# Patient Record
Sex: FEMALE | Race: BLACK OR AFRICAN AMERICAN | Employment: FULL TIME | URBAN - METROPOLITAN AREA
[De-identification: names, ages, dates, MRNs, and addresses within clinical notes are randomized per-mention and may not be internally consistent; named-entity substitution may affect disease eponyms.]

---

## 2017-05-03 PROBLEM — N93.9 VAGINAL BLEEDING: Status: ACTIVE | Noted: 2017-05-03

## 2022-06-14 ENCOUNTER — HOSPITAL ENCOUNTER (INPATIENT)
Facility: HOSPITAL | Age: 47
LOS: 2 days | Discharge: HOME | DRG: 176 | End: 2022-06-16
Attending: EMERGENCY MEDICINE | Admitting: HOSPITALIST
Payer: COMMERCIAL

## 2022-06-14 DIAGNOSIS — I26.99 PULMONARY EMBOLISM: ICD-10-CM

## 2022-06-14 DIAGNOSIS — R07.9 CHEST PAIN: ICD-10-CM

## 2022-06-14 DIAGNOSIS — I26.99 PULMONARY EMBOLISM, UNSPECIFIED CHRONICITY, UNSPECIFIED PULMONARY EMBOLISM TYPE, UNSPECIFIED WHETHER ACUTE COR PULMONALE PRESENT: Primary | ICD-10-CM

## 2022-06-14 DIAGNOSIS — D64.9 ANEMIA, UNSPECIFIED TYPE: ICD-10-CM

## 2022-06-14 LAB
ALBUMIN SERPL-MCNC: 3.7 G/DL (ref 3.3–5.5)
ALP SERPL-CCNC: 65 U/L (ref 42–141)
APTT BLDCRRT: 23.6 SEC (ref 21–32)
APTT BLDCRRT: 34.2 SEC (ref 21–32)
B-HCG UR QL: NEGATIVE
BILIRUB SERPL-MCNC: 0.6 MG/DL (ref 0.2–1.6)
BUN SERPL-MCNC: 10 MG/DL (ref 7–22)
CALCIUM SERPL-MCNC: 9.1 MG/DL (ref 8–10.3)
CHLORIDE SERPL-SCNC: 102 MMOL/L (ref 98–108)
CREAT SERPL-MCNC: 0.7 MG/DL (ref 0.6–1.2)
CTP QC/QA: YES
CTP QC/QA: YES
GLUCOSE SERPL-MCNC: 98 MG/DL (ref 73–118)
HCT, POC: NORMAL
HCT, POC: NORMAL
HGB, POC: NORMAL (ref 14–18)
HGB, POC: NORMAL (ref 14–18)
INR PPP: 1 (ref 0.8–1.2)
MCH, POC: NORMAL
MCH, POC: NORMAL
MCHC, POC: NORMAL
MCHC, POC: NORMAL
MCV, POC: NORMAL
MCV, POC: NORMAL
MPV, POC: NORMAL
MPV, POC: NORMAL
POC ALT (SGPT): 18 U/L (ref 10–47)
POC AST (SGOT): 21 U/L (ref 11–38)
POC B-TYPE NATRIURETIC PEPTIDE: 34.4 PG/ML (ref 0–100)
POC CARDIAC TROPONIN I: 0 NG/ML
POC PLATELET COUNT: NORMAL
POC PLATELET COUNT: NORMAL
POC TCO2: 27 MMOL/L (ref 18–33)
POTASSIUM BLD-SCNC: 3.5 MMOL/L (ref 3.6–5.1)
PROTEIN, POC: 8.1 G/DL (ref 6.4–8.1)
PROTHROMBIN TIME: 10.9 SEC (ref 9–12.5)
RBC, POC: NORMAL
RBC, POC: NORMAL
RDW, POC: NORMAL
RDW, POC: NORMAL
SAMPLE: NORMAL
SARS-COV-2 RDRP RESP QL NAA+PROBE: NEGATIVE
SODIUM BLD-SCNC: 141 MMOL/L (ref 128–145)
WBC, POC: NORMAL
WBC, POC: NORMAL

## 2022-06-14 PROCEDURE — 85610 PROTHROMBIN TIME: CPT | Performed by: EMERGENCY MEDICINE

## 2022-06-14 PROCEDURE — 93010 ELECTROCARDIOGRAM REPORT: CPT | Mod: ,,, | Performed by: INTERNAL MEDICINE

## 2022-06-14 PROCEDURE — 25500020 PHARM REV CODE 255: Mod: ER | Performed by: EMERGENCY MEDICINE

## 2022-06-14 PROCEDURE — 93010 EKG 12-LEAD: ICD-10-PCS | Mod: ,,, | Performed by: INTERNAL MEDICINE

## 2022-06-14 PROCEDURE — 25000003 PHARM REV CODE 250: Mod: ER | Performed by: EMERGENCY MEDICINE

## 2022-06-14 PROCEDURE — 84484 ASSAY OF TROPONIN QUANT: CPT | Mod: ER

## 2022-06-14 PROCEDURE — 81025 URINE PREGNANCY TEST: CPT | Mod: ER | Performed by: EMERGENCY MEDICINE

## 2022-06-14 PROCEDURE — 96360 HYDRATION IV INFUSION INIT: CPT | Mod: ER

## 2022-06-14 PROCEDURE — U0002 COVID-19 LAB TEST NON-CDC: HCPCS | Mod: ER | Performed by: EMERGENCY MEDICINE

## 2022-06-14 PROCEDURE — 80053 COMPREHEN METABOLIC PANEL: CPT | Mod: ER

## 2022-06-14 PROCEDURE — 85730 THROMBOPLASTIN TIME PARTIAL: CPT | Performed by: EMERGENCY MEDICINE

## 2022-06-14 PROCEDURE — 83880 ASSAY OF NATRIURETIC PEPTIDE: CPT | Mod: ER

## 2022-06-14 PROCEDURE — 63600175 PHARM REV CODE 636 W HCPCS: Mod: ER | Performed by: EMERGENCY MEDICINE

## 2022-06-14 PROCEDURE — 25000003 PHARM REV CODE 250: Performed by: HOSPITALIST

## 2022-06-14 PROCEDURE — 36415 COLL VENOUS BLD VENIPUNCTURE: CPT | Performed by: HOSPITALIST

## 2022-06-14 PROCEDURE — 93005 ELECTROCARDIOGRAM TRACING: CPT | Mod: ER

## 2022-06-14 PROCEDURE — 85730 THROMBOPLASTIN TIME PARTIAL: CPT | Mod: 91 | Performed by: HOSPITALIST

## 2022-06-14 PROCEDURE — 21400001 HC TELEMETRY ROOM

## 2022-06-14 PROCEDURE — 99291 CRITICAL CARE FIRST HOUR: CPT | Mod: 25,ER

## 2022-06-14 PROCEDURE — 85025 COMPLETE CBC W/AUTO DIFF WBC: CPT | Mod: ER

## 2022-06-14 RX ORDER — ONDANSETRON 2 MG/ML
8 INJECTION INTRAMUSCULAR; INTRAVENOUS EVERY 6 HOURS PRN
Status: DISCONTINUED | OUTPATIENT
Start: 2022-06-14 | End: 2022-06-16 | Stop reason: HOSPADM

## 2022-06-14 RX ORDER — HEPARIN SODIUM,PORCINE/D5W 25000/250
18 INTRAVENOUS SOLUTION INTRAVENOUS CONTINUOUS
Status: DISCONTINUED | OUTPATIENT
Start: 2022-06-14 | End: 2022-06-16

## 2022-06-14 RX ORDER — TALC
6 POWDER (GRAM) TOPICAL NIGHTLY PRN
Status: DISCONTINUED | OUTPATIENT
Start: 2022-06-14 | End: 2022-06-16 | Stop reason: HOSPADM

## 2022-06-14 RX ORDER — ACETAMINOPHEN 325 MG/1
650 TABLET ORAL EVERY 4 HOURS PRN
Status: DISCONTINUED | OUTPATIENT
Start: 2022-06-14 | End: 2022-06-16 | Stop reason: HOSPADM

## 2022-06-14 RX ORDER — OXYCODONE AND ACETAMINOPHEN 5; 325 MG/1; MG/1
1 TABLET ORAL EVERY 4 HOURS PRN
Status: DISCONTINUED | OUTPATIENT
Start: 2022-06-14 | End: 2022-06-16 | Stop reason: HOSPADM

## 2022-06-14 RX ORDER — FUROSEMIDE 20 MG/1
TABLET ORAL
Status: ON HOLD | COMMUNITY
Start: 2022-06-10 | End: 2022-06-16 | Stop reason: HOSPADM

## 2022-06-14 RX ORDER — GUAIFENESIN/DEXTROMETHORPHAN 100-10MG/5
5 SYRUP ORAL EVERY 6 HOURS PRN
Status: DISCONTINUED | OUTPATIENT
Start: 2022-06-14 | End: 2022-06-16 | Stop reason: HOSPADM

## 2022-06-14 RX ORDER — POLYETHYLENE GLYCOL 3350 17 G/17G
17 POWDER, FOR SOLUTION ORAL 2 TIMES DAILY PRN
Status: DISCONTINUED | OUTPATIENT
Start: 2022-06-14 | End: 2022-06-16 | Stop reason: HOSPADM

## 2022-06-14 RX ORDER — SODIUM CHLORIDE 0.9 % (FLUSH) 0.9 %
10 SYRINGE (ML) INJECTION EVERY 12 HOURS PRN
Status: DISCONTINUED | OUTPATIENT
Start: 2022-06-14 | End: 2022-06-16 | Stop reason: HOSPADM

## 2022-06-14 RX ORDER — APIXABAN 5 MG/1
5 TABLET, FILM COATED ORAL 2 TIMES DAILY
Status: ON HOLD | COMMUNITY
Start: 2022-06-10 | End: 2022-06-16 | Stop reason: SDUPTHER

## 2022-06-14 RX ORDER — BENZONATATE 100 MG/1
200 CAPSULE ORAL
Status: COMPLETED | OUTPATIENT
Start: 2022-06-14 | End: 2022-06-14

## 2022-06-14 RX ADMIN — GUAIFENESIN AND DEXTROMETHORPHAN 5 ML: 100; 10 SYRUP ORAL at 03:06

## 2022-06-14 RX ADMIN — BENZONATATE 200 MG: 100 CAPSULE ORAL at 10:06

## 2022-06-14 RX ADMIN — OXYCODONE AND ACETAMINOPHEN 1 TABLET: 5; 325 TABLET ORAL at 03:06

## 2022-06-14 RX ADMIN — IOHEXOL 90 ML: 350 INJECTION, SOLUTION INTRAVENOUS at 09:06

## 2022-06-14 RX ADMIN — SODIUM CHLORIDE 1000 ML: 0.9 INJECTION, SOLUTION INTRAVENOUS at 08:06

## 2022-06-14 RX ADMIN — HEPARIN SODIUM 18 UNITS/KG/HR: 5000 INJECTION INTRAVENOUS; SUBCUTANEOUS at 11:06

## 2022-06-14 NOTE — SUBJECTIVE & OBJECTIVE
Past Medical History:   Diagnosis Date    DVT (deep venous thrombosis)        Past Surgical History:   Procedure Laterality Date    TUBAL LIGATION         Review of patient's allergies indicates:  No Known Allergies    No current facility-administered medications on file prior to encounter.     Current Outpatient Medications on File Prior to Encounter   Medication Sig    ELIQUIS 5 mg Tab Take 5 mg by mouth 2 (two) times daily.    furosemide (LASIX) 20 MG tablet TAKE 1 TABLET BY MOUTH EVERY DAY FOR 3 DAYS     Family History    None       Tobacco Use    Smoking status: Never Smoker    Smokeless tobacco: Never Used   Substance and Sexual Activity    Alcohol use: Never    Drug use: No    Sexual activity: Yes     Partners: Male     Birth control/protection: Surgical     Review of Systems   Constitutional:  Negative for chills and fever.   HENT:  Negative for ear discharge and ear pain.    Eyes:  Negative for discharge and itching.   Respiratory:  Positive for cough and shortness of breath.    Cardiovascular:  Positive for chest pain and leg swelling.   Gastrointestinal:  Negative for abdominal distention and abdominal pain.   Endocrine: Negative for cold intolerance.   Genitourinary:  Negative for difficulty urinating and dysuria.   Musculoskeletal:  Negative for neck pain and neck stiffness.   Skin:  Negative for rash and wound.   Neurological:  Negative for seizures and syncope.   Psychiatric/Behavioral:  Negative for agitation and hallucinations.    Objective:     Vital Signs (Most Recent):  Temp: 99 °F (37.2 °C) (06/14/22 0641)  Pulse: 83 (06/14/22 1201)  Resp: 18 (06/14/22 1533)  BP: (!) 161/89 (06/14/22 1201)  SpO2: 100 % (06/14/22 1201)   Vital Signs (24h Range):  Temp:  [99 °F (37.2 °C)] 99 °F (37.2 °C)  Pulse:  [] 83  Resp:  [18-21] 18  SpO2:  [100 %] 100 %  BP: (124-161)/(81-89) 161/89     Weight: 93.3 kg (205 lb 11 oz)  Body mass index is 31.27 kg/m².    Physical Exam  Constitutional:       Appearance:  She is not toxic-appearing or diaphoretic.   HENT:      Head: Normocephalic and atraumatic.      Mouth/Throat:      Pharynx: Oropharynx is clear. No oropharyngeal exudate or posterior oropharyngeal erythema.   Cardiovascular:      Rate and Rhythm: Normal rate and regular rhythm.   Pulmonary:      Breath sounds: Normal breath sounds. No wheezing.   Abdominal:      General: Bowel sounds are normal.      Palpations: Abdomen is soft.   Musculoskeletal:         General: No deformity.      Right lower leg: Edema present.   Skin:     General: Skin is warm and dry.   Neurological:      Mental Status: She is oriented to person, place, and time. Mental status is at baseline.           Significant Labs: All pertinent labs within the past 24 hours have been reviewed.  BMP: No results for input(s): GLU, NA, K, CL, CO2, BUN, CREATININE, CALCIUM, MG in the last 48 hours.  CBC: No results for input(s): WBC, HGB, HCT, PLT in the last 48 hours.    Significant Imaging: I have reviewed all pertinent imaging results/findings within the past 24 hours.

## 2022-06-14 NOTE — NURSING
Report received from Lauryn at [facility] ED. Patient to come to 307 with heparin infusing

## 2022-06-14 NOTE — H&P
[facility] Medicine  History & Physical    Patient Name: Sandra Rodriguez  MRN: [id number]  Patient Class: IP- Inpatient  Admission Date: 6/14/2022  Attending Physician: Paul Gregory MD   Primary Care Provider: Cadence Garcia MD         Patient information was obtained from patient and ER records.     Subjective:     Principal Problem:Acute pulmonary embolism    Chief Complaint:   Chief Complaint   Patient presents with    Chest Pain     Reports left side chest pain x 2 days with SOB. Has been taking Ibuprofen with no relief. Denies N/V. Hx of DVT currently taking Eliquis.         HPI: 45 y/o female with recently diagnosed DVT presented to the ER with constant sharp left sided chest pain, slight dyspnea and cough.  Symptoms began yesterday at work.  Patient reports her cough began before the chest pain.  Patient also complains of right leg swelling and numbness.  She she was diagnosed with a right leg DVT 4 days ago and started on Eliquis.  States compliance with medication.  Denies any fever or chills.  He presented to [facility] ER where she was diagnosed with PE.  Denies any recent immobilization.  Started on Heparin drip.  No other complaints.      Past Medical History:   Diagnosis Date    DVT (deep venous thrombosis)        Past Surgical History:   Procedure Laterality Date    TUBAL LIGATION         Review of patient's allergies indicates:  No Known Allergies    No current facility-administered medications on file prior to encounter.     Current Outpatient Medications on File Prior to Encounter   Medication Sig    ELIQUIS 5 mg Tab Take 5 mg by mouth 2 (two) times daily.    furosemide (LASIX) 20 MG tablet TAKE 1 TABLET BY MOUTH EVERY DAY FOR 3 DAYS     Family History    None       Tobacco Use    Smoking status: Never Smoker    Smokeless tobacco: Never Used   Substance and Sexual Activity    Alcohol use: Never    Drug use: No    Sexual activity: Yes     Partners: Male     Birth  control/protection: Surgical     Review of Systems   Constitutional:  Negative for chills and fever.   HENT:  Negative for ear discharge and ear pain.    Eyes:  Negative for discharge and itching.   Respiratory:  Positive for cough and shortness of breath.    Cardiovascular:  Positive for chest pain and leg swelling.   Gastrointestinal:  Negative for abdominal distention and abdominal pain.   Endocrine: Negative for cold intolerance.   Genitourinary:  Negative for difficulty urinating and dysuria.   Musculoskeletal:  Negative for neck pain and neck stiffness.   Skin:  Negative for rash and wound.   Neurological:  Negative for seizures and syncope.   Psychiatric/Behavioral:  Negative for agitation and hallucinations.    Objective:     Vital Signs (Most Recent):  Temp: 99 °F (37.2 °C) (06/14/22 0641)  Pulse: 83 (06/14/22 1201)  Resp: 18 (06/14/22 1533)  BP: (!) 161/89 (06/14/22 1201)  SpO2: 100 % (06/14/22 1201)   Vital Signs (24h Range):  Temp:  [99 °F (37.2 °C)] 99 °F (37.2 °C)  Pulse:  [] 83  Resp:  [18-21] 18  SpO2:  [100 %] 100 %  BP: (124-161)/(81-89) 161/89     Weight: 93.3 kg (205 lb 11 oz)  Body mass index is 31.27 kg/m².    Physical Exam  Constitutional:       Appearance: She is not toxic-appearing or diaphoretic.   HENT:      Head: Normocephalic and atraumatic.      Mouth/Throat:      Pharynx: Oropharynx is clear. No oropharyngeal exudate or posterior oropharyngeal erythema.   Cardiovascular:      Rate and Rhythm: Normal rate and regular rhythm.   Pulmonary:      Breath sounds: Normal breath sounds. No wheezing.   Abdominal:      General: Bowel sounds are normal.      Palpations: Abdomen is soft.   Musculoskeletal:         General: No deformity.      Right lower leg: Edema present.   Skin:     General: Skin is warm and dry.   Neurological:      Mental Status: She is oriented to person, place, and time. Mental status is at baseline.           Significant Labs: All pertinent labs within the past 24  hours have been reviewed.  BMP: No results for input(s): GLU, NA, K, CL, CO2, BUN, CREATININE, CALCIUM, MG in the last 48 hours.  CBC: No results for input(s): WBC, HGB, HCT, PLT in the last 48 hours.    Significant Imaging: I have reviewed all pertinent imaging results/findings within the past 24 hours.    Assessment/Plan:     * Acute pulmonary embolism  Recent diagnosis of RLE DVT 4 days ago.  Started on Eliquis (10 mg bid).  Reports compliance with medication.  CT showing acute bilateral pulmonary emboli, with the central most filling defects in the left main pulmonary artery.  Additional bilateral lobar, interlobar, segmental, and subsegmental emboli are noted, eccentric to the left. Equivocal flattening of the interventricular septum, raising the possibility of right heart strain.   Apparent unprovoked DVT/PE.  Started on heparin drip.  Will consult Hematology.  Check Echo.  Discussed with IR.  Hemodynamically stable.  No intervention per IR and continue anticoagulation.      Anemia  Microcytic anemia.  Reports heavy menses.  No evidence of active bleeding.  Check iron studies.        VTE Risk Mitigation (From admission, onward)         Ordered     IP VTE HIGH RISK PATIENT  Once         06/14/22 1357     Place sequential compression device  Until discontinued         06/14/22 1357     heparin 25,000 units in dextrose 5% (100 units/ml) IV bolus from bag - ADDITIONAL PRN BOLUS - 60 units/kg  As needed (PRN)        Question:  Heparin Infusion Adjustment (DO NOT MODIFY ANSWER)  Answer:  \\[email or web address]\Images\Pharmacy\HeparinInfusions\heparin HIGH INTENSITY nomogram for [facility] [id number]    06/14/22 1112     heparin 25,000 units in dextrose 5% (100 units/ml) IV bolus from bag - ADDITIONAL PRN BOLUS - 30 units/kg  As needed (PRN)        Question:  Heparin Infusion Adjustment (DO NOT MODIFY ANSWER)  Answer:  \\[email or web address]\epic\Images\Pharmacy\HeparinInfusions\heparin HIGH INTENSITY nomogram for [facility] [id number].pdf     06/14/22 1112     heparin 25,000 units in dextrose 5% 250 mL (100 units/mL) infusion HIGH INTENSITY nomogram - [facility]  Continuous        Question Answer Comment   Heparin Infusion Adjustment (DO NOT MODIFY ANSWER) \\[email or web address]\epic\Images\Pharmacy\HeparinInfusions\heparin HIGH INTENSITY nomogram for [facility] [id number].pdf    Begin at (in units/kg/hr) 18        06/14/22 1112                   Paul Gregory MD  Department of Hospital Medicine   [facility] [place] [facility]

## 2022-06-14 NOTE — ED PROVIDER NOTES
Encounter Date: 6/14/2022    SCRIBE #1 NOTE: I, Richie Frausto, am scribing for, and in the presence of,  Marlen Steven. I have scribed the following portions of the note - Other sections scribed: HPI, ROS, PE.       History     Chief Complaint   Patient presents with    Chest Pain     Reports left side chest pain x 2 days with SOB. Has been taking Ibuprofen with no relief. Denies N/V. Hx of DVT currently taking Eliquis.      Sandra Rodriguez 46 y.o. female, with DVT, presents to the ED with constant sharp left sided chest pain, slight SOB, and cough that began yesterday at work. Patient reports her cough began before the chest pain. Patient also complains of right leg swelling and numbness from a knee injury last week. She states she was diagnosed with a DVT 2 days ago and has been on eliquis since. Patient denies fever, runny nose, congestion, nausea, vomiting, diarrhea, or other associated symptoms. No known alleviating or aggravating factors. Denies smoking, alcohol consumption, or illicit drug use.      The history is provided by the patient. No  was used.     Review of patient's allergies indicates:  No Known Allergies  Past Medical History:   Diagnosis Date    DVT (deep venous thrombosis)      Past Surgical History:   Procedure Laterality Date    TUBAL LIGATION       Family History   Problem Relation Age of Onset    Breast cancer Neg Hx     Colon cancer Neg Hx     Ovarian cancer Neg Hx      Social History     Tobacco Use    Smoking status: Never Smoker    Smokeless tobacco: Never Used   Substance Use Topics    Alcohol use: Never    Drug use: No     Review of Systems   Constitutional: Negative for activity change, appetite change, chills and fever.   HENT: Negative for congestion, postnasal drip, rhinorrhea, sneezing and sore throat.    Respiratory: Positive for cough and shortness of breath.    Cardiovascular: Positive for chest pain and leg swelling.   Gastrointestinal: Negative for  abdominal pain, diarrhea, nausea and vomiting.   Neurological: Positive for numbness. Negative for dizziness, syncope, light-headedness and headaches.   All other systems reviewed and are negative.      Physical Exam     Initial Vitals [06/14/22 0641]   BP Pulse Resp Temp SpO2   124/81 105 20 99 °F (37.2 °C) 100 %      MAP       --         Physical Exam    Nursing note and vitals reviewed.  Constitutional: She appears well-developed and well-nourished. No distress.   HENT:   Head: Normocephalic and atraumatic.   Eyes: Conjunctivae are normal.   Neck:   Normal range of motion.  Cardiovascular: Normal rate, regular rhythm and normal heart sounds.   No murmur heard.  Pulmonary/Chest: Breath sounds normal. No respiratory distress. She exhibits no tenderness.   Abdominal: Bowel sounds are normal. She exhibits no distension.   Musculoskeletal:         General: Normal range of motion.      Cervical back: Normal range of motion.      Right lower leg: Edema present.     Neurological: She is alert and oriented to person, place, and time.   Skin: Skin is warm and dry.   Psychiatric: She has a normal mood and affect. Her behavior is normal.         ED Course   Critical Care    Date/Time: 6/14/2022 12:33 PM  Performed by: Tenisha Gee MD  Authorized by: Tenisha Gee MD   Direct patient critical care time: 30 minutes  Additional history critical care time: 5 minutes  Ordering / reviewing critical care time: 10 minutes  Documentation critical care time: 8 minutes  Consulting other physicians critical care time: 8 minutes  Total critical care time (exclusive of procedural time) : 61 minutes  Critical care time was exclusive of separately billable procedures and treating other patients.  Critical care was necessary to treat or prevent imminent or life-threatening deterioration of the following conditions: cardiac failure, circulatory failure and respiratory failure.  Critical care was time spent personally by me on the following  activities: development of treatment plan with patient or surrogate, discussions with consultants, interpretation of cardiac output measurements, evaluation of patient's response to treatment, examination of patient, obtaining history from patient or surrogate, ordering and performing treatments and interventions, ordering and review of laboratory studies, ordering and review of radiographic studies, pulse oximetry, re-evaluation of patient's condition and review of old charts.        Labs Reviewed   POCT CMP - Abnormal; Notable for the following components:       Result Value    POC Potassium 3.5 (*)     All other components within normal limits   TROPONIN ISTAT   APTT   PROTIME-INR   POCT CBC   POCT URINE PREGNANCY   POCT CBC   SARS-COV-2 RDRP GENE    Narrative:     This test utilizes isothermal nucleic acid amplification   technology to detect the SARS-CoV-2 RdRp nucleic acid segment.   The analytical sensitivity (limit of detection) is 125 genome   equivalents/mL.   A POSITIVE result implies infection with the SARS-CoV-2 virus;   the patient is presumed to be contagious.     A NEGATIVE result means that SARS-CoV-2 nucleic acids are not   present above the limit of detection. A NEGATIVE result should be   treated as presumptive. It does not rule out the possibility of   COVID-19 and should not be the sole basis for treatment decisions.   If COVID-19 is strongly suspected based on clinical and exposure   history, re-testing using an alternate molecular assay should be   considered.   This test is only for use under the Food and Drug   Administration s Emergency Use Authorization (EUA).   Commercial kits are provided by [employer].   Performance characteristics of the EUA have been independently   verified by [facility] Department of   Pathology and Laboratory Medicine.   _________________________________________________________________   The authorized Fact Sheet for Healthcare Providers and  the authorized Fact   Sheet for Patients of the ID NOW COVID-19 are available on the FDA   website:     https://www.fda.gov/media/567149/download  https://www.fda.gov/media/070883/download          POCT CMP   POCT TROPONIN   POCT B-TYPE NATRIURETIC PEPTIDE (BNP)   POCT B-TYPE NATRIURETIC PEPTIDE (BNP)          Imaging Results           CTA Chest Non-Coronary (PE Study) (Final result)  Result time 06/14/22 10:06:02    Final result by Josh Bland MD (06/14/22 10:06:02)                 Impression:      1. Acute bilateral pulmonary emboli are noted, with the central most filling defects in the left main pulmonary artery approximately 28 mm from the bifurcation.  Additional bilateral lobar, interlobar, segmental, and subsegmental emboli are noted, eccentric to the left. Equivocal flattening of the interventricular septum, raising the possibility of right heart strain.  2. Limited assessment of the lung parenchyma without definite acute abnormality.  3. Ill-defined focus of hyperattenuation/enhancement measuring 12 mm in the right hepatic lobe.  This could represent transient vascular phenomenon versus true enhancing lesion.  Further characterization with dedicated liver mass protocol MRI or CT would be recommended for more definitive assessment.  4. Additional details as per the body of report.  This report was flagged in Epic as abnormal.    Findings in Impression #1-2 discussed by phone with Dr. Gee approximately 09:57, 06/14/2022.      Electronically signed by: Josh Bland  Date:    06/14/2022  Time:    10:06             Narrative:    EXAMINATION:  CTA CHEST NON CORONARY    CLINICAL HISTORY:  Pulmonary embolism (PE) suspected, high prob;    TECHNIQUE:  Low dose axial images, sagittal and coronal reformations were obtained from the thoracic inlet to the lung bases following the IV administration of 90 mL of Omnipaque 350.  Contrast timing was optimized to evaluate the pulmonary arteries.  MIP images were  performed.    COMPARISON:  None    FINDINGS:  Exam quality: Satisfactory.    Pulmonary embolism: Acute bilateral pulmonary emboli are noted, with the central most filling defects in the left main pulmonary artery approximately 28 mm from the bifurcation.  Additional bilateral lobar, interlobar, segmental, and subsegmental emboli are noted, eccentric to the left.    Central pulmonary arteries: Normal caliber.    Aorta: Normal caliber.    Heart: Equivocal flattening of the interventricular septum.    Coronary arteries: No calcifications.    Pericardium: Normal. No effusion, thickening, or calcification.    Support tubes and lines: None.    Base of neck/thyroid: Normal.    Lymph nodes: No supraclavicular, axillary, internal mammary, mediastinal, or hilar adenopathy.    Esophagus: Normal.    Pleura: No effusion, thickening, or calcification.    Upper abdomen: Simple cyst is noted in the right hepatic lobe.  Ill-defined focus of hyperattenuation/enhancement measuring 12 mm in the right hepatic lobe (series 2, image 171).    Body wall: Unremarkable    Airways: Central airways appear patent.    Lungs: Assessment of the lungs is substantially compromised by respiratory motion.  Dependent atelectasis is suggested.    Bones: No definite acute findings.                                 Medications   heparin 25,000 units in dextrose 5% 250 mL (100 units/mL) infusion HIGH INTENSITY nomogram - [facility] (18 Units/kg/hr × 76.5 kg (Adjusted) Intravenous New Bag 6/14/22 1153)   heparin 25,000 units in dextrose 5% (100 units/ml) IV bolus from bag - ADDITIONAL PRN BOLUS - 60 units/kg (has no administration in time range)   heparin 25,000 units in dextrose 5% (100 units/ml) IV bolus from bag - ADDITIONAL PRN BOLUS - 30 units/kg (has no administration in time range)   sodium chloride 0.9% bolus 1,000 mL (0 mLs Intravenous Stopped 6/14/22 0911)   iohexoL (OMNIPAQUE 350) injection 100 mL (90 mLs Intravenous Given 6/14/22 0949)   benzonatate  capsule 200 mg (200 mg Oral Given 6/14/22 1050)   heparin 25,000 units in dextrose 5% (100 units/ml) IV bolus from bag INITIAL BOLUS (6,120 Units Intravenous Bolus from Bag 6/14/22 1153)     Medical Decision Making:   History:   Old Medical Records: I decided to obtain old medical records.  Independently Interpreted Test(s):   I have ordered and independently interpreted X-rays - see prior notes.  I have ordered and independently interpreted EKG Reading(s) - see prior notes  Clinical Tests:   Lab Tests: Ordered and Reviewed  Radiological Study: Ordered and Reviewed  ED Management:  CT was dicussed with the radiologist - pt has large clot burden with right heart strain.  Vital signs are stable.  Admission was discussed with Dr. Gregory- will start heparin.  Other:   I have discussed this case with another health care provider.              Scribe Attestation:   Scribe #1: I performed the above scribed service and the documentation accurately describes the services I performed. I attest to the accuracy of the note.               I, Dr. Tenisha Gee, personally performed the services described in this documentation.   All medical record entries made by the scribe were at my direction and in my presence.   I have reviewed the chart and agree that the record is accurate and complete.   Tenisha Gee MD.  8:20 AM 06/14/2022     Clinical Impression:   Final diagnoses:  [R07.9] Chest pain  [I26.99] Pulmonary embolism, unspecified chronicity, unspecified pulmonary embolism type, unspecified whether acute cor pulmonale present (Primary)  [D64.9] Anemia, unspecified type          ED Disposition Condition    Admit               Tenisha Gee MD  06/14/22 1234

## 2022-06-14 NOTE — NURSING TRANSFER
Nursing Transfer Note      6/14/2022       Reason patient is being transferred: Pulmonary Embolism      Transfer From: [facility]      Transfer via stretcher      Transfer with cardiac monitoring      Transported by EMS Services      Medicines sent: No      Any special needs or follow-up needed: None      Chart send with patient: Yes      Notified: daughter      Patient reassessed at: 6/14/2022, 1425      Upon arrival to floor: cardiac monitor applied, patient oriented to room, call bell in reach and bed in lowest position    Heparin Handoff Completed at bedside.

## 2022-06-14 NOTE — ASSESSMENT & PLAN NOTE
Recent diagnosis of RLE DVT 4 days ago.  Started on Eliquis (10 mg bid).  Reports compliance with medication.  CT showing acute bilateral pulmonary emboli, with the central most filling defects in the left main pulmonary artery.  Additional bilateral lobar, interlobar, segmental, and subsegmental emboli are noted, eccentric to the left. Equivocal flattening of the interventricular septum, raising the possibility of right heart strain.   Apparent unprovoked DVT/PE.  Started on heparin drip.  Will consult Hematology.  Check Echo.  Discussed with IR.  Hemodynamically stable.  No intervention per IR and continue anticoagulation.

## 2022-06-14 NOTE — HPI
45 y/o female with recently diagnosed DVT presented to the ER with constant sharp left sided chest pain, slight dyspnea and cough.  Symptoms began yesterday at work.  Patient reports her cough began before the chest pain.  Patient also complains of right leg swelling and numbness.  She she was diagnosed with a right leg DVT 4 days ago and started on Eliquis.  States compliance with medication.  Denies any fever or chills.  He presented to [facility] ER where she was diagnosed with PE.  Denies any recent immobilization.  Started on Heparin drip.  No other complaints.

## 2022-06-15 PROBLEM — N92.2 EXCESSIVE MENSTRUATION AT PUBERTY: Status: ACTIVE | Noted: 2022-06-15

## 2022-06-15 PROBLEM — D50.9 IDA (IRON DEFICIENCY ANEMIA): Status: ACTIVE | Noted: 2022-06-15

## 2022-06-15 PROBLEM — N92.2 EXCESSIVE MENSTRUATION AT PUBERTY: Status: RESOLVED | Noted: 2022-06-15 | Resolved: 2022-06-15

## 2022-06-15 LAB
ANION GAP SERPL CALC-SCNC: 8 MMOL/L (ref 8–16)
APTT BLDCRRT: 42.8 SEC (ref 21–32)
APTT BLDCRRT: 46.8 SEC (ref 21–32)
ASCENDING AORTA: 2.8 CM
AV INDEX (PROSTH): 0.79
AV MEAN GRADIENT: 6 MMHG
AV PEAK GRADIENT: 9 MMHG
AV VALVE AREA: 2.6 CM2
AV VELOCITY RATIO: 0.84
BASOPHILS # BLD AUTO: 0.01 K/UL (ref 0–0.2)
BASOPHILS NFR BLD: 0.1 % (ref 0–1.9)
BSA FOR ECHO PROCEDURE: 2.12 M2
BUN SERPL-MCNC: 8 MG/DL (ref 6–20)
CALCIUM SERPL-MCNC: 8.5 MG/DL (ref 8.7–10.5)
CHLORIDE SERPL-SCNC: 106 MMOL/L (ref 95–110)
CO2 SERPL-SCNC: 24 MMOL/L (ref 23–29)
CREAT SERPL-MCNC: 0.7 MG/DL (ref 0.5–1.4)
CV ECHO LV RWT: 0.47 CM
DIFFERENTIAL METHOD: ABNORMAL
DOP CALC AO PEAK VEL: 1.54 M/S
DOP CALC AO VTI: 26.53 CM
DOP CALC LVOT AREA: 3.3 CM2
DOP CALC LVOT DIAMETER: 2.05 CM
DOP CALC LVOT PEAK VEL: 1.3 M/S
DOP CALC LVOT STROKE VOLUME: 68.95 CM3
DOP CALCLVOT PEAK VEL VTI: 20.9 CM
E WAVE DECELERATION TIME: 246.91 MSEC
E/A RATIO: 0.78
E/E' RATIO: 7.38 M/S
ECHO LV POSTERIOR WALL: 1.08 CM (ref 0.6–1.1)
EJECTION FRACTION: 55 %
EOSINOPHIL # BLD AUTO: 0.1 K/UL (ref 0–0.5)
EOSINOPHIL NFR BLD: 0.7 % (ref 0–8)
ERYTHROCYTE [DISTWIDTH] IN BLOOD BY AUTOMATED COUNT: 17.2 % (ref 11.5–14.5)
EST. GFR  (AFRICAN AMERICAN): >60 ML/MIN/1.73 M^2
EST. GFR  (NON AFRICAN AMERICAN): >60 ML/MIN/1.73 M^2
FERRITIN SERPL-MCNC: 41 NG/ML (ref 20–300)
FRACTIONAL SHORTENING: 27 % (ref 28–44)
GLUCOSE SERPL-MCNC: 103 MG/DL (ref 70–110)
HCT VFR BLD AUTO: 20.8 % (ref 37–48.5)
HGB BLD-MCNC: 6.1 G/DL (ref 12–16)
IMM GRANULOCYTES # BLD AUTO: 0.02 K/UL (ref 0–0.04)
IMM GRANULOCYTES NFR BLD AUTO: 0.3 % (ref 0–0.5)
INTERVENTRICULAR SEPTUM: 1.13 CM (ref 0.6–1.1)
IRON SERPL-MCNC: 13 UG/DL (ref 30–160)
IVRT: 97.05 MSEC
LA MAJOR: 5.95 CM
LA MINOR: 5.14 CM
LA WIDTH: 3.68 CM
LEFT ATRIUM SIZE: 2.8 CM
LEFT ATRIUM VOLUME INDEX: 23.3 ML/M2
LEFT ATRIUM VOLUME: 48.31 CM3
LEFT INTERNAL DIMENSION IN SYSTOLE: 3.4 CM (ref 2.1–4)
LEFT VENTRICLE DIASTOLIC VOLUME INDEX: 47.67 ML/M2
LEFT VENTRICLE DIASTOLIC VOLUME: 98.67 ML
LEFT VENTRICLE MASS INDEX: 89 G/M2
LEFT VENTRICLE SYSTOLIC VOLUME INDEX: 22.9 ML/M2
LEFT VENTRICLE SYSTOLIC VOLUME: 47.4 ML
LEFT VENTRICULAR INTERNAL DIMENSION IN DIASTOLE: 4.63 CM (ref 3.5–6)
LEFT VENTRICULAR MASS: 184.27 G
LV LATERAL E/E' RATIO: 5.9 M/S
LV SEPTAL E/E' RATIO: 9.83 M/S
LYMPHOCYTES # BLD AUTO: 1.3 K/UL (ref 1–4.8)
LYMPHOCYTES NFR BLD: 18.7 % (ref 18–48)
MAGNESIUM SERPL-MCNC: 2.1 MG/DL (ref 1.6–2.6)
MCH RBC QN AUTO: 23.5 PG (ref 27–31)
MCHC RBC AUTO-ENTMCNC: 29.3 G/DL (ref 32–36)
MCV RBC AUTO: 80 FL (ref 82–98)
MONOCYTES # BLD AUTO: 0.7 K/UL (ref 0.3–1)
MONOCYTES NFR BLD: 9.3 % (ref 4–15)
MV PEAK A VEL: 0.76 M/S
MV PEAK E VEL: 0.59 M/S
MV STENOSIS PRESSURE HALF TIME: 71.6 MS
MV VALVE AREA P 1/2 METHOD: 3.07 CM2
NEUTROPHILS # BLD AUTO: 4.9 K/UL (ref 1.8–7.7)
NEUTROPHILS NFR BLD: 70.9 % (ref 38–73)
NRBC BLD-RTO: 0 /100 WBC
PHOSPHATE SERPL-MCNC: 3 MG/DL (ref 2.7–4.5)
PISA TR MAX VEL: 2.06 M/S
PLATELET # BLD AUTO: 411 K/UL (ref 150–450)
PMV BLD AUTO: 9.5 FL (ref 9.2–12.9)
POTASSIUM SERPL-SCNC: 3.3 MMOL/L (ref 3.5–5.1)
PULM VEIN S/D RATIO: 1.94
PV PEAK D VEL: 0.48 M/S
PV PEAK S VEL: 0.93 M/S
PV PEAK VELOCITY: 0.99 CM/S
RA MAJOR: 5.43 CM
RA PRESSURE: 3 MMHG
RA WIDTH: 3.6 CM
RBC # BLD AUTO: 2.6 M/UL (ref 4–5.4)
RIGHT VENTRICULAR END-DIASTOLIC DIMENSION: 3.86 CM
RV TISSUE DOPPLER FREE WALL SYSTOLIC VELOCITY 1 (APICAL 4 CHAMBER VIEW): 14.76 CM/S
SATURATED IRON: 3 % (ref 20–50)
SODIUM SERPL-SCNC: 138 MMOL/L (ref 136–145)
STJ: 2.8 CM
TDI LATERAL: 0.1 M/S
TDI SEPTAL: 0.06 M/S
TDI: 0.08 M/S
TOTAL IRON BINDING CAPACITY: 413 UG/DL (ref 250–450)
TR MAX PG: 17 MMHG
TRANSFERRIN SERPL-MCNC: 279 MG/DL (ref 200–375)
TRICUSPID ANNULAR PLANE SYSTOLIC EXCURSION: 2.4 CM
TV REST PULMONARY ARTERY PRESSURE: 20 MMHG
WBC # BLD AUTO: 6.96 K/UL (ref 3.9–12.7)

## 2022-06-15 PROCEDURE — 25000003 PHARM REV CODE 250: Performed by: EMERGENCY MEDICINE

## 2022-06-15 PROCEDURE — 99232 PR SUBSEQUENT HOSPITAL CARE,LEVL II: ICD-10-PCS | Mod: ,,, | Performed by: OBSTETRICS & GYNECOLOGY

## 2022-06-15 PROCEDURE — 25000003 PHARM REV CODE 250: Performed by: HOSPITALIST

## 2022-06-15 PROCEDURE — 82728 ASSAY OF FERRITIN: CPT | Performed by: HOSPITALIST

## 2022-06-15 PROCEDURE — 21400001 HC TELEMETRY ROOM

## 2022-06-15 PROCEDURE — 99223 PR INITIAL HOSPITAL CARE,LEVL III: ICD-10-PCS | Mod: ,,, | Performed by: INTERNAL MEDICINE

## 2022-06-15 PROCEDURE — 83735 ASSAY OF MAGNESIUM: CPT | Performed by: HOSPITALIST

## 2022-06-15 PROCEDURE — 84466 ASSAY OF TRANSFERRIN: CPT | Performed by: HOSPITALIST

## 2022-06-15 PROCEDURE — 63600175 PHARM REV CODE 636 W HCPCS: Performed by: HOSPITALIST

## 2022-06-15 PROCEDURE — 84100 ASSAY OF PHOSPHORUS: CPT | Performed by: HOSPITALIST

## 2022-06-15 PROCEDURE — 80048 BASIC METABOLIC PNL TOTAL CA: CPT | Performed by: HOSPITALIST

## 2022-06-15 PROCEDURE — 36415 COLL VENOUS BLD VENIPUNCTURE: CPT | Performed by: HOSPITALIST

## 2022-06-15 PROCEDURE — 99232 SBSQ HOSP IP/OBS MODERATE 35: CPT | Mod: ,,, | Performed by: OBSTETRICS & GYNECOLOGY

## 2022-06-15 PROCEDURE — 85025 COMPLETE CBC W/AUTO DIFF WBC: CPT | Performed by: HOSPITALIST

## 2022-06-15 PROCEDURE — 85730 THROMBOPLASTIN TIME PARTIAL: CPT | Performed by: HOSPITALIST

## 2022-06-15 PROCEDURE — 99223 1ST HOSP IP/OBS HIGH 75: CPT | Mod: ,,, | Performed by: INTERNAL MEDICINE

## 2022-06-15 PROCEDURE — 63600175 PHARM REV CODE 636 W HCPCS: Performed by: EMERGENCY MEDICINE

## 2022-06-15 RX ORDER — FERROUS GLUCONATE 324(37.5)
324 TABLET ORAL 2 TIMES DAILY WITH MEALS
Status: DISCONTINUED | OUTPATIENT
Start: 2022-06-16 | End: 2022-06-16 | Stop reason: HOSPADM

## 2022-06-15 RX ADMIN — OXYCODONE AND ACETAMINOPHEN 1 TABLET: 5; 325 TABLET ORAL at 06:06

## 2022-06-15 RX ADMIN — GUAIFENESIN AND DEXTROMETHORPHAN 5 ML: 100; 10 SYRUP ORAL at 06:06

## 2022-06-15 RX ADMIN — IRON SUCROSE 200 MG: 20 INJECTION, SOLUTION INTRAVENOUS at 01:06

## 2022-06-15 RX ADMIN — HEPARIN SODIUM 20 UNITS/KG/HR: 5000 INJECTION INTRAVENOUS; SUBCUTANEOUS at 04:06

## 2022-06-15 RX ADMIN — OXYCODONE AND ACETAMINOPHEN 1 TABLET: 5; 325 TABLET ORAL at 01:06

## 2022-06-15 RX ADMIN — OXYCODONE AND ACETAMINOPHEN 1 TABLET: 5; 325 TABLET ORAL at 11:06

## 2022-06-15 RX ADMIN — HEPARIN SODIUM 20 UNITS/KG/HR: 5000 INJECTION INTRAVENOUS; SUBCUTANEOUS at 01:06

## 2022-06-15 RX ADMIN — GUAIFENESIN AND DEXTROMETHORPHAN 5 ML: 100; 10 SYRUP ORAL at 11:06

## 2022-06-15 NOTE — ASSESSMENT & PLAN NOTE
Recent diagnosis of RLE DVT 4 days prior to presentation.  Started on Eliquis (10 mg bid).  Reports compliance with medication.  CT showing acute bilateral pulmonary emboli, with the central most filling defects in the left main pulmonary artery.  Additional bilateral lobar, interlobar, segmental, and subsegmental emboli are noted, eccentric to the left. Equivocal flattening of the interventricular septum, raising the possibility of right heart strain.   Apparent unprovoked DVT/PE.  Started on heparin drip.  Will consult Hematology.  No evidence of RH strain on Echo.  Discussed with IR.  Hemodynamically stable.  No intervention per IR and continue anticoagulation.  Continue heparin drip one more day and probably switch to NOAC tomorrow.

## 2022-06-15 NOTE — HOSPITAL COURSE
45 y/o female admitted with extensive PE.  Hemodynamically stable.  Discussed with IR and no intervention recommended.  Started on Heparin drip.  Unprovoked recent DVT and now with PE.  Hematology consulted.  Recommending transitioning back to Eliquis.  Unprovoked DVT/PE and will undergo outpatient hypercoagulable work up.  Patient had been recently diagnosed with DVT and started on Eliquis 10 mg bid.  No CTA was done at that time.  Transition back to Eliquis 10 mg bid to complete 7 day course from diagnosed DVT and then 5 mg bid.  Patient was also noted to be severely anemic.  Iron deficiency anemia secondary to menorrhagia.  Current hemoglobin of 5.8.  Recommended blood transfusion.  Patient is currently asymptomatic and refusing transfusion.  Risks and benefits of blood transfusion discussed with patient and all questions answered.  She continues to refuse transfusion.  Patient was given IV iron during hospital stay.  Will be discharged on oral iron replacement.  Gyn consulted and discussed outpatient Mirena IUD.  She has remained afebrile and hemodynamically stable.  Patient will be discharged home to follow up with PCP, Hematology and Gyn.

## 2022-06-15 NOTE — PLAN OF CARE
[facility] - [place] - Telemetry  Initial Discharge Assessment       Primary Care Provider: Cadence Garcia MD    Admission Diagnosis: Chest pain [R07.9]  Anemia, unspecified type [D64.9]  Pulmonary embolism, unspecified chronicity, unspecified pulmonary embolism type, unspecified whether acute cor pulmonale present [I26.99]    Admission Date: 6/14/2022  Expected Discharge Date:     Discharge Barriers Identified: None    Payor: [employer] / Plan: [facility] SELECT PLUS / Product Type: Commercial /     Extended Emergency Contact Information  Primary Emergency Contact: Ceferino De La Vega   [facility]  Home Phone: [phone]  Relation: Other    Discharge Plan A: Home with family  Discharge Plan B: Home with family, Other (TBD)      [id number] - [place], [address] AT [facility] & [address]  [place] [address]  Phone: [phone] Fax: [phone]      Initial Assessment (most recent)       Adult Discharge Assessment - 06/15/22 1530          Discharge Assessment    Assessment Type Discharge Planning Assessment     Confirmed/corrected address, phone number and insurance Yes     Confirmed Demographics Correct on Facesheet     Source of Information patient     If unable to respond/provide information was family/caregiver contacted? No Contact Information Available     When was your last doctors appointment? --   Patient stated her last PCP was on Friday that past.    Communicated JOSELITO with patient/caregiver Date not available/Unable to determine     Reason For Admission Chest pain     Lives With spouse     Facility Arrived From: Home     Do you expect to return to your current living situation? Yes     Do you have help at home or someone to help you manage your care at home? Yes     Who are your caregiver(s) and their phone number(s)? Long Rodriguez (spouse) [phone]     Prior to hospitilization cognitive status: Alert/Oriented     Current cognitive status:  Alert/Oriented     Equipment Currently Used at Home none     Readmission within 30 days? No     Patient currently being followed by outpatient case management? No     Do you currently have service(s) that help you manage your care at home? No     Do you take prescription medications? Yes     Do you have prescription coverage? Yes     Coverage [employer]     Do you have any problems affording any of your prescribed medications? No     Is the patient taking medications as prescribed? yes     Who is going to help you get home at discharge? Long Rodriguez (spouse) [phone]     How do you get to doctors appointments? car, drives self;family or friend will provide     Are you on dialysis? No     Do you take coumadin? No     Discharge Plan A Home with family     Discharge Plan B Home with family;Other   TBD    DME Needed Upon Discharge  other (see comments)   TBD    Discharge Plan discussed with: Patient     Discharge Barriers Identified None        Relationship/Environment    Name(s) of Who Lives With Patient Long Rodriguez (spouse) [phone]                      Patient prefers morning appointments.

## 2022-06-15 NOTE — ASSESSMENT & PLAN NOTE
Patient diagnosed with right lower extremity DVT 4 days ago at outside facility  No records available at time  CTA imaging not performed at time of diagnosis  CTA during hospitalization showing acute bilateral pulmonary emboli, with the central most filling defects in the left main pulmonary artery.  Additional bilateral lobar, interlobar, segmental, and subsegmental emboli are noted, eccentric to the left. Equivocal flattening of the interventricular septum, raising the possibility of right heart strain.   No prior history of DVTs  No family history of DVTs  No obvious precipitating factors  Plan thrombophilia workup as an outpatient.  No indication to perform thrombophilia workup during inpatient status  Recommend transition back  to DOAC when appropriate.

## 2022-06-15 NOTE — CONSULTS
[facility] - [place] - Telemetry  Hematology/Oncology  Consult Note    Patient Name: Sandra Rodriguez  MRN: [id number]  Admission Date: 6/14/2022  Hospital Length of Stay: 1 days  Code Status: Full Code   Attending Provider: Paul Gregory MD  Consulting Provider: Earlene Acevedo MD  Primary Care Physician: Cadence Garcia MD  Principal Problem:Acute pulmonary embolism    Inpatient consult to Telemedicine - Oncology  Consult performed by: Earlene Acevedo MD  Consult ordered by: Earlene Acevedo MD        Subjective:   Reason For Consultation: DVT/PE  HPI:  47 y/o female with recently diagnosed DVT presented to the ED with constant sharp left sided chest pain with mild assoc dyspnea and cough .  She was diagnosed with a right lower extremity DVT at an outside facility.  She was prescribed Eliquis 10 mg p.o. b.i.d. x7 days to be followed by 5 mg p.o. b.i.d..  Patient endorses compliance.  She did not undergo CTA of chest imaging at time of diagnosis of DVT.  She also has been diagnosed with iron deficiency anemia and reports a history of heavy menstrual cycles.  Hemoglobin is 6. She declines transfusion.  No prior history of transfusion.  She is agreeable to IV iron infusions.  No family history of clots.  No history of miscarriages.  No prolonged periods of immobility.  No new medications.  No OCP use.  No recent trauma.  She suffered a knee sprain back in February.  Consulted for unprovoked DVT/PE          VIRTUAL TELENOTE    Start time:4: 30pm  Chief complaint: dvt  The patient location is: [facility]  The patient arrived at: 4:30 pm  Present with the patient at the time of the telemed/virtual assessment:alone    End time:  4:45pm    Total time spent with patient: 15min  The attending portion of this evaluation, treatment, and documentation was performed per Earlene Acevedo MD via Telemedicine AudioVisual using the secure [employer] software platform with 2 way audio/video. The provider was located off-site and the patient is  located in the hospital. The aforementioned video software was utilized to document the relevant history and physical exam.       Oncology Treatment Plan:   [Could not find a treatment plan. This SmartLink may be configured incorrectly. Contact a  for help.]    Medications:  Continuous Infusions:   heparin (porcine) in D5W 20 Units/kg/hr (06/15/22 0112)     Scheduled Meds:   [START ON 6/16/2022] ferrous gluconate  324 mg Oral BID WM     PRN Meds:acetaminophen, dextromethorphan-guaiFENesin  mg/5 ml, heparin (PORCINE), heparin (PORCINE), melatonin, ondansetron, oxyCODONE-acetaminophen, polyethylene glycol, sodium chloride 0.9%     Review of patient's allergies indicates:  No Known Allergies     Past Medical History:   Diagnosis Date    DVT (deep venous thrombosis)      Past Surgical History:   Procedure Laterality Date    TUBAL LIGATION       Family History    None       Tobacco Use    Smoking status: Never Smoker    Smokeless tobacco: Never Used   Substance and Sexual Activity    Alcohol use: Never    Drug use: No    Sexual activity: Yes     Partners: Male     Birth control/protection: Surgical       Review of Systems   Constitutional:  Positive for fatigue. Negative for appetite change, fever and unexpected weight change.   HENT:  Negative for mouth sores.    Eyes:  Negative for visual disturbance.   Respiratory:  Positive for cough. Negative for shortness of breath.    Cardiovascular:  Positive for chest pain (improved) and leg swelling.   Gastrointestinal:  Negative for abdominal pain and diarrhea.   Genitourinary:  Negative for frequency.   Musculoskeletal:  Negative for back pain.   Skin:  Negative for rash.   Neurological:  Negative for headaches.   Hematological:  Negative for adenopathy.   Psychiatric/Behavioral:  The patient is not nervous/anxious.    Objective:     Vital Signs (Most Recent):  Temp: 98.4 °F (36.9 °C) (06/15/22 1600)  Pulse: 91 (06/15/22 1600)  Resp: 20  (06/15/22 1600)  BP: 118/73 (06/15/22 1600)  SpO2: (!) 94 % (06/15/22 1600)   Vital Signs (24h Range):  Temp:  [98.2 °F (36.8 °C)-99.8 °F (37.7 °C)] 98.4 °F (36.9 °C)  Pulse:  [86-93] 91  Resp:  [18-20] 20  SpO2:  [94 %-100 %] 94 %  BP: ()/(58-75) 118/73     Weight: 93.3 kg (205 lb 11 oz)  Body mass index is 31.27 kg/m².  Body surface area is 2.12 meters squared.      Intake/Output Summary (Last 24 hours) at 6/15/2022 1630  Last data filed at 6/15/2022 0840  Gross per 24 hour   Intake 480 ml   Output --   Net 480 ml       Physical Exam    Significant Labs:   All pertinent labs within the past 24 hours have been reviewed     Diagnostic Results:  I have reviewed and interpreted all pertinent imaging results/findings within the past 24 hours     Assessment/Plan:     * Acute pulmonary embolism  Patient diagnosed with right lower extremity DVT 4 days ago at outside facility  No records available at time  CTA imaging not performed at time of diagnosis  CTA during hospitalization showing acute bilateral pulmonary emboli, with the central most filling defects in the left main pulmonary artery.  Additional bilateral lobar, interlobar, segmental, and subsegmental emboli are noted, eccentric to the left. Equivocal flattening of the interventricular septum, raising the possibility of right heart strain.   No prior history of DVTs  No family history of DVTs  No obvious precipitating factors  Plan thrombophilia workup as an outpatient.  No indication to perform thrombophilia workup during inpatient status  Recommend transition back  to DOAC when appropriate.    DK (iron deficiency anemia)  Patient with iron deficiency anemia of unknown duration likely secondary to menorrhagia  Hb 6g/dL  Gyn consulted  Plan vwprofile testing for completeness  Patient declines packed red blood cell transfusion.    She is undergoing iron infusions  It is also recommended she undergo screening colonoscopy  as recommended per the newly revised  colon cancer screening  guidelines  She will need to undergo additional iron infusions as outpatient  Discussed with patient potential increased bleeding while undergoing anticoagulation.  Pt may need to undergo prbc transfusions if Hb remains<6           Thank you for your consult.      Earlene Acevedo MD  Hematology/Oncology  [facility]

## 2022-06-15 NOTE — HPI
45 y/o female with recently diagnosed DVT presented to the ED with constant sharp left sided chest pain with mild assoc dyspnea and cough .  She was diagnosed with a right lower extremity DVT at an outside facility.  She was prescribed Eliquis 10 mg p.o. b.i.d. x7 days to be followed by 5 mg p.o. b.i.d..  Patient endorses compliance.  She did not undergo CTA of chest imaging at time of diagnosis of DVT.  She also has been diagnosed with iron deficiency anemia and reports a history of heavy menstrual cycles.  Hemoglobin is 6. She declines transfusion.  No prior history of transfusion.  She is agreeable to IV iron infusions.  No family history of clots.  No history of miscarriages.  No prolonged periods of immobility.  No new medications.  No OCP use.  No recent trauma.  She suffered a knee sprain back in February.  Consulted for unprovoked DVT/PE

## 2022-06-15 NOTE — PROGRESS NOTES
[facility] Medicine  Progress Note    Patient Name: Sandra Rodriguez  MRN: [id number]  Patient Class: IP- Inpatient   Admission Date: 6/14/2022  Length of Stay: 1 days  Attending Physician: Paul Gregory MD  Primary Care Provider: Cadence Garcia MD        Subjective:     Principal Problem:Acute pulmonary embolism        HPI:  45 y/o female with recently diagnosed DVT presented to the ER with constant sharp left sided chest pain, slight dyspnea and cough.  Symptoms began yesterday at work.  Patient reports her cough began before the chest pain.  Patient also complains of right leg swelling and numbness.  She she was diagnosed with a right leg DVT 4 days ago and started on Eliquis.  States compliance with medication.  Denies any fever or chills.  He presented to [place] [facility] where she was diagnosed with PE.  Denies any recent immobilization.  Started on Heparin drip.  No other complaints.      Overview/Hospital Course:  45 y/o female admitted with extensive PE.  Hemodynamically stable.  Discussed with IR and no intervention recommended.  Started on Heparin drip.  Unprovoked recent DVT and now with PE.  Hematology consulted.        Interval History: feeling well with no new complaints.    Review of Systems   HENT:  Negative for ear discharge and ear pain.    Eyes:  Negative for discharge and itching.   Endocrine: Negative for cold intolerance and heat intolerance.   Neurological:  Negative for syncope and numbness.   Objective:     Vital Signs (Most Recent):  Temp: 99.1 °F (37.3 °C) (06/15/22 1245)  Pulse: 86 (06/15/22 1245)  Resp: 20 (06/15/22 1245)  BP: 110/67 (06/15/22 1245)  SpO2: 95 % (06/15/22 1245) Vital Signs (24h Range):  Temp:  [98.2 °F (36.8 °C)-99.8 °F (37.7 °C)] 99.1 °F (37.3 °C)  Pulse:  [86-93] 86  Resp:  [18-20] 20  SpO2:  [95 %-100 %] 95 %  BP: ()/(58-75) 110/67     Weight: 93.3 kg (205 lb 11 oz)  Body mass index is 31.27 kg/m².    Intake/Output Summary (Last 24 hours) at  6/15/2022 1254  Last data filed at 6/15/2022 0840  Gross per 24 hour   Intake 480 ml   Output --   Net 480 ml      Physical Exam  Constitutional:       Appearance: She is not toxic-appearing or diaphoretic.   HENT:      Head: Normocephalic and atraumatic.      Mouth/Throat:      Pharynx: Oropharynx is clear. No oropharyngeal exudate or posterior oropharyngeal erythema.   Cardiovascular:      Rate and Rhythm: Normal rate and regular rhythm.   Pulmonary:      Breath sounds: Normal breath sounds. No wheezing.   Abdominal:      General: Bowel sounds are normal.      Palpations: Abdomen is soft.   Musculoskeletal:         General: No deformity.      Right lower leg: Edema present.   Skin:     General: Skin is warm and dry.   Neurological:      Mental Status: She is oriented to person, place, and time. Mental status is at baseline.       Significant Labs: All pertinent labs within the past 24 hours have been reviewed.  BMP:   Recent Labs   Lab 06/15/22  0151         K 3.3*      CO2 24   BUN 8   CREATININE 0.7   CALCIUM 8.5*   MG 2.1     CBC:   Recent Labs   Lab 06/15/22  0152   WBC 6.96   HGB 6.1*   HCT 20.8*          Significant Imaging: I have reviewed all pertinent imaging results/findings within the past 24 hours.      Assessment/Plan:      * Acute pulmonary embolism  Recent diagnosis of RLE DVT 4 days prior to presentation.  Started on Eliquis (10 mg bid).  Reports compliance with medication.  CT showing acute bilateral pulmonary emboli, with the central most filling defects in the left main pulmonary artery.  Additional bilateral lobar, interlobar, segmental, and subsegmental emboli are noted, eccentric to the left. Equivocal flattening of the interventricular septum, raising the possibility of right heart strain.   Apparent unprovoked DVT/PE.  Started on heparin drip.  Will consult Hematology.  No evidence of RH strain on Echo.  Discussed with IR.  Hemodynamically stable.  No intervention  per IR and continue anticoagulation.  Continue heparin drip one more day and probably switch to NOAC tomorrow.      Anemia  Significantly anemic with Hgb of 6.1  Iron deficiency anemia secondary to chronic blood loss from heavy menses.  Currently on her menses.  Patient states that she is asymptomatic and would like to avoid blood transfusion.  Will give IV iron and start on oral iron replacement.  Discussed concern about increased bleeding while on anticoagulation.  Hematology consulted.        VTE Risk Mitigation (From admission, onward)         Ordered     IP VTE HIGH RISK PATIENT  Once         06/14/22 1357     Place sequential compression device  Until discontinued         06/14/22 1357     heparin 25,000 units in dextrose 5% (100 units/ml) IV bolus from bag - ADDITIONAL PRN BOLUS - 60 units/kg  As needed (PRN)        Question:  Heparin Infusion Adjustment (DO NOT MODIFY ANSWER)  Answer:  \\[email or web address]\epic\Images\Pharmacy\HeparinInfusions\heparin HIGH INTENSITY nomogram for [facility] [id number]    06/14/22 1112     heparin 25,000 units in dextrose 5% (100 units/ml) IV bolus from bag - ADDITIONAL PRN BOLUS - 30 units/kg  As needed (PRN)        Question:  Heparin Infusion Adjustment (DO NOT MODIFY ANSWER)  Answer:  \\[email or web address]\epic\Images\Pharmacy\HeparinInfusions\heparin HIGH INTENSITY nomogram for [facility] [id number]    06/14/22 1112     heparin 25,000 units in dextrose 5% 250 mL (100 units/mL) infusion HIGH INTENSITY nomogram - [facility]  Continuous        Question Answer Comment   Heparin Infusion Adjustment (DO NOT MODIFY ANSWER) [email or web address]\epic\Images\Pharmacy\HeparinInfusions\heparin HIGH INTENSITY nomogram for [facility] [id number]    Begin at (in units/kg/hr) 18        06/14/22 1112                Discharge Planning   JOSELITO:      Code Status: Full Code   Is the patient medically ready for discharge?:     Reason for patient still in hospital (select all that apply): Treatment                     Paul Gregory,  MD  Department of Hospital Medicine   [facility] - [place] - Telemetry

## 2022-06-15 NOTE — SUBJECTIVE & OBJECTIVE
Interval History: feeling well with no new complaints.    Review of Systems   HENT:  Negative for ear discharge and ear pain.    Eyes:  Negative for discharge and itching.   Endocrine: Negative for cold intolerance and heat intolerance.   Neurological:  Negative for syncope and numbness.   Objective:     Vital Signs (Most Recent):  Temp: 99.1 °F (37.3 °C) (06/15/22 1245)  Pulse: 86 (06/15/22 1245)  Resp: 20 (06/15/22 1245)  BP: 110/67 (06/15/22 1245)  SpO2: 95 % (06/15/22 1245) Vital Signs (24h Range):  Temp:  [98.2 °F (36.8 °C)-99.8 °F (37.7 °C)] 99.1 °F (37.3 °C)  Pulse:  [86-93] 86  Resp:  [18-20] 20  SpO2:  [95 %-100 %] 95 %  BP: ()/(58-75) 110/67     Weight: 93.3 kg (205 lb 11 oz)  Body mass index is 31.27 kg/m².    Intake/Output Summary (Last 24 hours) at 6/15/2022 1254  Last data filed at 6/15/2022 0840  Gross per 24 hour   Intake 480 ml   Output --   Net 480 ml      Physical Exam  Constitutional:       Appearance: She is not toxic-appearing or diaphoretic.   HENT:      Head: Normocephalic and atraumatic.      Mouth/Throat:      Pharynx: Oropharynx is clear. No oropharyngeal exudate or posterior oropharyngeal erythema.   Cardiovascular:      Rate and Rhythm: Normal rate and regular rhythm.   Pulmonary:      Breath sounds: Normal breath sounds. No wheezing.   Abdominal:      General: Bowel sounds are normal.      Palpations: Abdomen is soft.   Musculoskeletal:         General: No deformity.      Right lower leg: Edema present.   Skin:     General: Skin is warm and dry.   Neurological:      Mental Status: She is oriented to person, place, and time. Mental status is at baseline.       Significant Labs: All pertinent labs within the past 24 hours have been reviewed.  BMP:   Recent Labs   Lab 06/15/22  0151         K 3.3*      CO2 24   BUN 8   CREATININE 0.7   CALCIUM 8.5*   MG 2.1     CBC:   Recent Labs   Lab 06/15/22  0152   WBC 6.96   HGB 6.1*   HCT 20.8*          Significant  Imaging: I have reviewed all pertinent imaging results/findings within the past 24 hours.

## 2022-06-15 NOTE — ASSESSMENT & PLAN NOTE
Patient with iron deficiency anemia of unknown duration likely secondary to menorrhagia  Hb 6g/dL  Gyn consulted  Plan vwprofile testing for completeness  Patient declines packed red blood cell transfusion.    She is undergoing iron infusions  It is also recommended she undergo screening colonoscopy  as recommended per the newly revised colon cancer screening  guidelines  She will need to undergo additional iron infusions as outpatient  Discussed with patient potential increased bleeding while undergoing anticoagulation.  Pt may need to undergo prbc transfusions if Hb remains<6

## 2022-06-15 NOTE — ASSESSMENT & PLAN NOTE
Significantly anemic with Hgb of 6.1  Iron deficiency anemia secondary to chronic blood loss from heavy menses.  Currently on her menses.  Patient states that she is asymptomatic and would like to avoid blood transfusion.  Will give IV iron and start on oral iron replacement.  Discussed concern about increased bleeding while on anticoagulation.  Hematology consulted.

## 2022-06-15 NOTE — CONSULTS
"[facility]  History & Physical  Obstetrics & Gynecology    Date:  6/15/2022     Reason for consultation:  Abnormal uterine bleeding    History of Present Illness:      Sandra Rodriguez is a 46 y.o.  admitted to medicine service for unprovoked PE and DVT.    GYN consulted for abnormal uterine bleeding.    Pt reports a long standing h/o of abnormal uterine bleeding and chronic anemia, "greater 10 years".    Pt has seen GYN 22, review of note did not mention treatment for abnormal uterine bleeding.    On exam today, pt has scant spotting on menstrual pad and exam.    Pt was offered blood transfusion and declined, currently on IV iron therapy.      Past Medical History:      DVT  PE      Past Surgical History:     Bilateral tubal ligation    Medications:     No current facility-administered medications on file prior to encounter.     Current Outpatient Medications on File Prior to Encounter   Medication Sig Dispense Refill    ELIQUIS 5 mg Tab Take 5 mg by mouth 2 (two) times daily.      furosemide (LASIX) 20 MG tablet TAKE 1 TABLET BY MOUTH EVERY DAY FOR 3 DAYS       Allergies:      NKDA      Gynecologic History:      Denies history of STI  Recent pap  benign     Social History:      Denies tobacco, alcohol or illicit drug use     Family History:      Noncontributory     Review of Systems   Constitutional: Positive for fatigue.   HENT: Negative.    Eyes: Negative.    Respiratory: Positive for shortness of breath.         SOB improved   Cardiovascular: Positive for chest pain.        CP improved   Gastrointestinal: Negative.    Endocrine: Negative.    Genitourinary: Negative.    Musculoskeletal: Negative.    Integumentary:  Negative.   Neurological: Negative.    Hematological: Negative.    Psychiatric/Behavioral: Negative.    Breast: negative.       Vitals:    Temp:  [98.2 °F (36.8 °C)-99.8 °F (37.7 °C)] 98.4 °F (36.9 °C)  Pulse:  [86-93] 91  Resp:  [18-20] 18  SpO2:  [94 %-100 %] " "94 %  BP: ()/(58-75) 118/73    /73 (Patient Position: Lying)   Pulse 91   Temp 98.4 °F (36.9 °C) (Oral)   Resp 18   Ht 5' 8" (1.727 m)   Wt 93.3 kg (205 lb 11 oz)   LMP 2022   SpO2 (!) 94%   Breastfeeding No   BMI 31.27 kg/m²     Physical Exam:   Constitutional: She appears well-developed. No distress.                             HEENT:  NCAT, anicteric, moist mucus membranes. Neck supple w/o masses.  LUNGS:  Clear normal respiratory effort  HEART:  RRR  ABDOMEN:  Soft, non-tender, non-distended. Normoactive BS. No obvious organomegaly.    PELVIC:  Female external genitalia w/o any obvious lesions. Female hair distribution. Normal urethral meatus. No gross lymphadenopathy.   VAGINA:  Pink, moist, well-rugated. Good support. No obvious lesion. No discharge, scant spotting.  CERVIX:  No cervical motion tenderness, discharge, or obvious lesions.   UTERUS:  Small, non-tender, normal contour  ADNEXA:  No masses, non-tender    RECTAL:  Declined. No obvious external lesions    Chaperone present for exam.    Labs/studies:    Lab Results   Component Value Date    WBC 6.96 06/15/2022    HGB 6.1 (L) 06/15/2022    HCT 20.8 (L) 06/15/2022    MCV 80 (L) 06/15/2022     06/15/2022     Recent Labs   Lab 06/15/22  0151   CALCIUM 8.5*      K 3.3*   CO2 24      BUN 8   CREATININE 0.7     Assessment/Plan:     Sandra Rodriguez is 46 y.o.  with h/o BTL:    Abnormal uterine bleeding, currently scant vaginal bleeding    D/w pt various causes of abnormal uterine bleeding including various mgt options.  Since pt does not have significant vaginal bleeding at this time, no acute intervention is needed in light of acute PE and DVT.  After an extensive discussion of various treatment options for abnormal uterine bleeding, pt opted for Mirena IUD.  Risks, benefits, and alternatives to Mirena IUD discussed.  Theoretical risk of VTE with Mirena IUD discussed.  IUD is best placed outpt once " therapeutic anticoagulation has been established.  Order pelvic US and endometrial biopsy outpatient.   Pt is not interested in surgery (D&C, endometrial ablation or hysterectomy) at this time.  Pt advised to follow up with GYN outpt, timely follow up advised.    Chronic anemia    Pt declined blood transfusion  Receiving Fe therapy  Heme following  Anemia precautions    PE/DVT:    Anticoagulation per primary      Thank you for consult, GYN will follow up as needed while inpatient, please call for assistance.       Edi Jefferson MD

## 2022-06-15 NOTE — PLAN OF CARE
1900: Assumed care of patient. Patient in no apparent distress. Safety precautions in place. Call button within reach. Heparin handoff completed with FERMIN Dumont.     2000: PTT: 34.2. Heparin bolus given and rate changed to 20 units/kg/hr. Next ptt ordered for 0200.    0200: Ptt therapeautic. Next PTT ordered for 0800.     0220: Notified Anne Reyna NP of pts H/H (6.1/20.8). No new orders    0700: Report given to day shift rn. Heparin Handoff completed.         Problem: Adult Inpatient Plan of Care  Goal: Plan of Care Review  6/14/2022 2055 by Roderick Balderas RN  Outcome: Ongoing, Progressing  6/14/2022 2054 by Roderick Balderas RN  Outcome: Ongoing, Progressing  6/14/2022 2053 by Roderick Balderas RN  Outcome: Ongoing, Progressing  Goal: Patient-Specific Goal (Individualized)  6/14/2022 2055 by Roderick Balderas RN  Outcome: Ongoing, Progressing  6/14/2022 2054 by Roderick Balderas RN  Outcome: Ongoing, Progressing  6/14/2022 2053 by Roderick Balderas RN  Outcome: Ongoing, Progressing  Goal: Absence of Hospital-Acquired Illness or Injury  6/14/2022 2055 by Roderick Balderas RN  Outcome: Ongoing, Progressing  6/14/2022 2054 by Roderick Balderas RN  Outcome: Ongoing, Progressing  6/14/2022 2053 by Roderick Balderas RN  Outcome: Ongoing, Progressing  Goal: Optimal Comfort and Wellbeing  6/14/2022 2055 by Roderick Balderas RN  Outcome: Ongoing, Progressing  6/14/2022 2054 by Roderick Balderas RN  Outcome: Ongoing, Progressing  6/14/2022 2053 by Roderick Balderas RN  Outcome: Ongoing, Progressing  Goal: Readiness for Transition of Care  6/14/2022 2055 by Roderick Balderas RN  Outcome: Ongoing, Progressing  6/14/2022 2054 by Roderick Balderas RN  Outcome: Ongoing, Progressing  6/14/2022 2053 by Roderick Balderas RN  Outcome: Ongoing, Progressing

## 2022-06-15 NOTE — NURSING
APTT therapeutic range, continue heparin at current rate 20 u/kg/hr. New orders received for hem/onc consult

## 2022-06-15 NOTE — SUBJECTIVE & OBJECTIVE
VIRTUAL TELENOTE    Start time:4: 30pm  Chief complaint: dvt  The patient location is: [facility]  The patient arrived at: 4:30 pm  Present with the patient at the time of the telemed/virtual assessment:alone    End time:  4:45pm    Total time spent with patient: 15min  The attending portion of this evaluation, treatment, and documentation was performed per Earlene Acevedo MD via Telemedicine AudioVisual using the secure [employer] software platform with 2 way audio/video. The provider was located off-site and the patient is located in the hospital. The aforementioned video software was utilized to document the relevant history and physical exam.       Oncology Treatment Plan:   [Could not find a treatment plan. This SmartLink may be configured incorrectly. Contact a  for help.]    Medications:  Continuous Infusions:   heparin (porcine) in D5W 20 Units/kg/hr (06/15/22 0112)     Scheduled Meds:   [START ON 6/16/2022] ferrous gluconate  324 mg Oral BID WM     PRN Meds:acetaminophen, dextromethorphan-guaiFENesin  mg/5 ml, heparin (PORCINE), heparin (PORCINE), melatonin, ondansetron, oxyCODONE-acetaminophen, polyethylene glycol, sodium chloride 0.9%     Review of patient's allergies indicates:  No Known Allergies     Past Medical History:   Diagnosis Date    DVT (deep venous thrombosis)      Past Surgical History:   Procedure Laterality Date    TUBAL LIGATION       Family History    None       Tobacco Use    Smoking status: Never Smoker    Smokeless tobacco: Never Used   Substance and Sexual Activity    Alcohol use: Never    Drug use: No    Sexual activity: Yes     Partners: Male     Birth control/protection: Surgical       Review of Systems   Constitutional:  Positive for fatigue. Negative for appetite change, fever and unexpected weight change.   HENT:  Negative for mouth sores.    Eyes:  Negative for visual disturbance.   Respiratory:  Positive for cough. Negative for shortness of breath.     Cardiovascular:  Positive for chest pain (improved) and leg swelling.   Gastrointestinal:  Negative for abdominal pain and diarrhea.   Genitourinary:  Negative for frequency.   Musculoskeletal:  Negative for back pain.   Skin:  Negative for rash.   Neurological:  Negative for headaches.   Hematological:  Negative for adenopathy.   Psychiatric/Behavioral:  The patient is not nervous/anxious.    Objective:     Vital Signs (Most Recent):  Temp: 98.4 °F (36.9 °C) (06/15/22 1600)  Pulse: 91 (06/15/22 1600)  Resp: 20 (06/15/22 1600)  BP: 118/73 (06/15/22 1600)  SpO2: (!) 94 % (06/15/22 1600)   Vital Signs (24h Range):  Temp:  [98.2 °F (36.8 °C)-99.8 °F (37.7 °C)] 98.4 °F (36.9 °C)  Pulse:  [86-93] 91  Resp:  [18-20] 20  SpO2:  [94 %-100 %] 94 %  BP: ()/(58-75) 118/73     Weight: 93.3 kg (205 lb 11 oz)  Body mass index is 31.27 kg/m².  Body surface area is 2.12 meters squared.      Intake/Output Summary (Last 24 hours) at 6/15/2022 1630  Last data filed at 6/15/2022 0840  Gross per 24 hour   Intake 480 ml   Output --   Net 480 ml       Physical Exam    Significant Labs:   All pertinent labs within the past 24 hours have been reviewed     Diagnostic Results:  I have reviewed and interpreted all pertinent imaging results/findings within the past 24 hours

## 2022-06-16 ENCOUNTER — TELEPHONE (OUTPATIENT)
Dept: HEMATOLOGY/ONCOLOGY | Facility: CLINIC | Age: 47
End: 2022-06-16
Payer: COMMERCIAL

## 2022-06-16 VITALS
BODY MASS INDEX: 31.17 KG/M2 | HEIGHT: 68 IN | HEART RATE: 92 BPM | SYSTOLIC BLOOD PRESSURE: 115 MMHG | TEMPERATURE: 98 F | DIASTOLIC BLOOD PRESSURE: 69 MMHG | RESPIRATION RATE: 20 BRPM | WEIGHT: 205.69 LBS | OXYGEN SATURATION: 98 %

## 2022-06-16 PROBLEM — D64.9 ANEMIA: Status: RESOLVED | Noted: 2022-06-14 | Resolved: 2022-06-16

## 2022-06-16 LAB
ANISOCYTOSIS BLD QL SMEAR: SLIGHT
APTT BLDCRRT: 42.9 SEC (ref 21–32)
BASOPHILS # BLD AUTO: 0.01 K/UL (ref 0–0.2)
BASOPHILS NFR BLD: 0.1 % (ref 0–1.9)
DACRYOCYTES BLD QL SMEAR: ABNORMAL
DIFFERENTIAL METHOD: ABNORMAL
EOSINOPHIL # BLD AUTO: 0.1 K/UL (ref 0–0.5)
EOSINOPHIL NFR BLD: 1.1 % (ref 0–8)
ERYTHROCYTE [DISTWIDTH] IN BLOOD BY AUTOMATED COUNT: 17 % (ref 11.5–14.5)
HCT VFR BLD AUTO: 20.8 % (ref 37–48.5)
HGB BLD-MCNC: 5.8 G/DL (ref 12–16)
HYPOCHROMIA BLD QL SMEAR: ABNORMAL
IMM GRANULOCYTES # BLD AUTO: 0.03 K/UL (ref 0–0.04)
IMM GRANULOCYTES NFR BLD AUTO: 0.4 % (ref 0–0.5)
LYMPHOCYTES # BLD AUTO: 1.1 K/UL (ref 1–4.8)
LYMPHOCYTES NFR BLD: 15.2 % (ref 18–48)
MCH RBC QN AUTO: 22.7 PG (ref 27–31)
MCHC RBC AUTO-ENTMCNC: 27.9 G/DL (ref 32–36)
MCV RBC AUTO: 82 FL (ref 82–98)
MONOCYTES # BLD AUTO: 0.7 K/UL (ref 0.3–1)
MONOCYTES NFR BLD: 9 % (ref 4–15)
NEUTROPHILS # BLD AUTO: 5.4 K/UL (ref 1.8–7.7)
NEUTROPHILS NFR BLD: 74.2 % (ref 38–73)
NRBC BLD-RTO: 0 /100 WBC
OVALOCYTES BLD QL SMEAR: ABNORMAL
PLATELET # BLD AUTO: 438 K/UL (ref 150–450)
PLATELET BLD QL SMEAR: ABNORMAL
PMV BLD AUTO: 10.1 FL (ref 9.2–12.9)
RBC # BLD AUTO: 2.55 M/UL (ref 4–5.4)
WBC # BLD AUTO: 7.22 K/UL (ref 3.9–12.7)

## 2022-06-16 PROCEDURE — 85730 THROMBOPLASTIN TIME PARTIAL: CPT | Performed by: HOSPITALIST

## 2022-06-16 PROCEDURE — 25000003 PHARM REV CODE 250: Performed by: HOSPITALIST

## 2022-06-16 PROCEDURE — 85390 FIBRINOLYSINS SCREEN I&R: CPT | Mod: 91 | Performed by: INTERNAL MEDICINE

## 2022-06-16 PROCEDURE — 85025 COMPLETE CBC W/AUTO DIFF WBC: CPT | Performed by: HOSPITALIST

## 2022-06-16 PROCEDURE — 85240 CLOT FACTOR VIII AHG 1 STAGE: CPT | Performed by: INTERNAL MEDICINE

## 2022-06-16 PROCEDURE — 36415 COLL VENOUS BLD VENIPUNCTURE: CPT | Performed by: HOSPITALIST

## 2022-06-16 PROCEDURE — 94761 N-INVAS EAR/PLS OXIMETRY MLT: CPT

## 2022-06-16 PROCEDURE — 85247 CLOT FACTOR VIII MULTIMETRIC: CPT | Performed by: INTERNAL MEDICINE

## 2022-06-16 RX ORDER — APIXABAN 5 MG/1
TABLET, FILM COATED ORAL
Start: 2022-06-16 | End: 2022-07-07

## 2022-06-16 RX ORDER — OXYCODONE AND ACETAMINOPHEN 5; 325 MG/1; MG/1
1 TABLET ORAL EVERY 8 HOURS PRN
Qty: 10 TABLET | Refills: 0 | Status: SHIPPED | OUTPATIENT
Start: 2022-06-16

## 2022-06-16 RX ORDER — FERROUS GLUCONATE 324(38)MG
324 TABLET ORAL 2 TIMES DAILY WITH MEALS
Qty: 60 TABLET | Refills: 11 | Status: SHIPPED | OUTPATIENT
Start: 2022-06-16 | End: 2023-06-16

## 2022-06-16 RX ADMIN — OXYCODONE AND ACETAMINOPHEN 1 TABLET: 5; 325 TABLET ORAL at 02:06

## 2022-06-16 RX ADMIN — Medication 324 MG: at 07:06

## 2022-06-16 RX ADMIN — APIXABAN 10 MG: 5 TABLET, FILM COATED ORAL at 10:06

## 2022-06-16 RX ADMIN — GUAIFENESIN AND DEXTROMETHORPHAN 5 ML: 100; 10 SYRUP ORAL at 07:06

## 2022-06-16 RX ADMIN — OXYCODONE AND ACETAMINOPHEN 1 TABLET: 5; 325 TABLET ORAL at 07:06

## 2022-06-16 NOTE — TELEPHONE ENCOUNTER
To Igor Schmitt    I was able to get Sandra Thomas   MRN # [id number] an appointment scheduled with Dr Lamont Deng on 7-7-22 at 11:20  She needs to arrive at the same [place] location that  we discussed a few minutes ago for 11:05  Thanks Des patton RN

## 2022-06-16 NOTE — DISCHARGE SUMMARY
[facility] Medicine  Discharge Summary      Patient Name: Sandra Rodriguez  MRN: [id number]  Patient Class: IP- Inpatient  Admission Date: 6/14/2022  Hospital Length of Stay: 2 days  Discharge Date and Time:  06/16/2022 9:21 AM  Attending Physician: Paul Gregory MD   Discharging Provider: Paul Gregory MD  Primary Care Provider: Cadence Garcia MD      HPI:   47 y/o female with recently diagnosed DVT presented to the ER with constant sharp left sided chest pain, slight dyspnea and cough.  Symptoms began yesterday at work.  Patient reports her cough began before the chest pain.  Patient also complains of right leg swelling and numbness.  She she was diagnosed with a right leg DVT 4 days ago and started on Eliquis.  States compliance with medication.  Denies any fever or chills.  He presented to [facility] where she was diagnosed with PE.  Denies any recent immobilization.  Started on Heparin drip.  No other complaints.      * No surgery found *      Hospital Course:   47 y/o female admitted with extensive PE.  Hemodynamically stable.  Discussed with IR and no intervention recommended.  Started on Heparin drip.  Unprovoked recent DVT and now with PE.  Hematology consulted.  Recommending transitioning back to Eliquis.  Unprovoked DVT/PE and will undergo outpatient hypercoagulable work up.  Patient had been recently diagnosed with DVT and started on Eliquis 10 mg bid.  No CTA was done at that time.  Transition back to Eliquis 10 mg bid to complete 7 day course from diagnosed DVT and then 5 mg bid.  Patient was also noted to be severely anemic.  Iron deficiency anemia secondary to menorrhagia.  Current hemoglobin of 5.8.  Recommended blood transfusion.  Patient is currently asymptomatic and refusing transfusion.  Risks and benefits of blood transfusion discussed with patient and all questions answered.  She continues to refuse transfusion.  Patient was given IV iron during hospital stay.  Will be  discharged on oral iron replacement.  Gyn consulted and discussed outpatient Mirena IUD.  She has remained afebrile and hemodynamically stable.  Patient will be discharged home to follow up with PCP, Hematology and Gyn.       Goals of Care Treatment Preferences:  Code Status: Full Code      Consults:   Consults (From admission, onward)        Status Ordering Provider     Inpatient consult to Telemedicine - Oncology  Once        Provider:  Earlene Acevedo MD    Completed EARLENE ACEVEDO     Inpatient consult to Gynecology  Once        Provider:  Edi Jefferson MD    Acknowledged IRENE JENSEN          No new Assessment & Plan notes have been filed under this hospital service since the last note was generated.  Service: Hospital Medicine    Final Active Diagnoses:    Diagnosis Date Noted POA    PRINCIPAL PROBLEM:  Acute pulmonary embolism [I26.99] 06/14/2022 Yes    DK (iron deficiency anemia) [D50.9] 06/15/2022 Yes      Problems Resolved During this Admission:    Diagnosis Date Noted Date Resolved POA    Excessive menstruation at puberty [N92.2] 06/15/2022 06/15/2022 Yes    Anemia [D64.9] 06/14/2022 06/16/2022 Yes       Discharged Condition: stable    Disposition: Home or Self Care    Follow Up:   Follow-up Information     Cadence Garcia MD Follow up in 1 week(s).    Specialty: Family Medicine  [facility] information[facility]  [address] [facility] [id number] [phone]             Earlene Acevedo MD Follow up in 2 week(s).    Specialties: Hematology and Oncology, Hematology  Contact information:  [address]  [address]  [facility] [id number] [phone]             Edi Jefferson MD Follow up in 2 week(s).    Specialty: Obstetrics and Gynecology  Contact information:  [address]  [address]  [place] [id number] [phone]                       Patient Instructions:      Diet Adult Regular     Notify your health care provider if you experience any of the following:  temperature >100.4     Notify  your health care provider if you experience any of the following:  persistent nausea and vomiting or diarrhea     Notify your health care provider if you experience any of the following:  severe uncontrolled pain     Notify your health care provider if you experience any of the following:  difficulty breathing or increased cough     Notify your health care provider if you experience any of the following:  persistent dizziness, light-headedness, or visual disturbances     Notify your health care provider if you experience any of the following:  increased confusion or weakness     Activity as tolerated           Pending Diagnostic Studies:     Procedure Component Value Units Date/Time    von Willebrand Profile [[id number]] Collected: 06/16/22 0401    Order Status: Sent Lab Status: In process Updated: 06/16/22 0439    Specimen: Blood          Medications:  Reconciled Home Medications:      Medication List      START taking these medications    ferrous gluconate 324 mg (37.5 mg iron) Tab tablet  Take 1 tablet (324 mg total) by mouth 2 (two) times daily with meals.     oxyCODONE-acetaminophen 5-325 mg per tablet  Commonly known as: PERCOCET  Take 1 tablet by mouth every 8 (eight) hours as needed for Pain.        CHANGE how you take these medications    ELIQUIS 5 mg Tab  Generic drug: apixaban  Take 2 tabs (10 mg) bid until 6/18, then start taking 1 tab (5 mg) bid on 6/19  What changed:   · how much to take  · how to take this  · when to take this  · additional instructions        STOP taking these medications    furosemide 20 MG tablet  Commonly known as: LASIX            Indwelling Lines/Drains at time of discharge:   Lines/Drains/Airways     None                 Time spent on the discharge of patient: >30 minutes         Paul Gregory MD  Department of Hospital Medicine  [facility]

## 2022-06-16 NOTE — TELEPHONE ENCOUNTER
----- Message from Des Bose RN sent at 6/16/2022  2:05 PM CDT -----  Dr Deng When I tried cancelling Dr Agarwal someone else was in chart  and I did not go back  Thank you for bringing it to my attention Des  ----- Message -----  From: Isa Booker RN  Sent: 6/16/2022   1:11 PM CDT  To: Des Bose RN    You made both these appointments. I'm not sure which provider you want her to see.   ----- Message -----  From: Debi Pavon  Sent: 6/16/2022  12:15 PM CDT  To: [place] Staff    Type:  Needs Medical Advice    Who Called: pt  Symptoms (please be specific):    How long has patient had these symptoms:    Pharmacy name and phone #:    Would the patient rather a call back or a response via [facility]? call  Best Call Back Number: [phone]  Additional Information: pt wants to know if she needs both appt  6/23 & 7/7

## 2022-06-16 NOTE — PLAN OF CARE
"1900: Assumed care of patient. Pt in no apparent distress. Heparin handoff completed with FERMIN Dumont. Next PTT to be drawn with morning labs.      0505: Notified ELMER Olsen of pts Hgb of 5.8. Told to ask patient if she still refuses blood transfusion.   When asked, pt states, " I don't want anyone's blood in me." Notified NP.     0700: Report given to day shift rn. Heparin handoff completed. No acute events overnight  Problem: Adult Inpatient Plan of Care  Goal: Plan of Care Review  Outcome: Ongoing, Progressing     Problem: Adult Inpatient Plan of Care  Goal: Absence of Hospital-Acquired Illness or Injury  Outcome: Ongoing, Progressing     Problem: VTE (Venous Thromboembolism)  Goal: VTE (Venous Thromboembolism) Symptom Resolution  Outcome: Ongoing, Progressing     Problem: Pain Acute  Goal: Acceptable Pain Control and Functional Ability  Outcome: Ongoing, Progressing     "

## 2022-06-16 NOTE — PLAN OF CARE
Problem: Adult Inpatient Plan of Care  Goal: Plan of Care Review  Outcome: Met  Goal: Patient-Specific Goal (Individualized)  Outcome: Met  Goal: Absence of Hospital-Acquired Illness or Injury  Outcome: Met  Goal: Optimal Comfort and Wellbeing  Outcome: Met  Goal: Readiness for Transition of Care  Outcome: Met     Problem: VTE (Venous Thromboembolism)  Goal: VTE (Venous Thromboembolism) Symptom Resolution  Outcome: Met     Problem: Pain Acute  Goal: Acceptable Pain Control and Functional Ability  Outcome: Met

## 2022-06-16 NOTE — PLAN OF CARE
[facility] - Telemetry  Discharge Final Note    Primary Care Provider: Cadence Garcia MD    Expected Discharge Date: 6/16/2022    Final Discharge Note (most recent)       Final Note - 06/16/22 1035          Final Note    Assessment Type Final Discharge Note     Anticipated Discharge Disposition Home or Self Care     What phone number can be called within the next 1-3 days to see how you are doing after discharge? [phone]     Hospital Resources/Appts/Education Provided Provided patient/caregiver with written discharge plan information;Appointments scheduled and added to AVS;Appointments scheduled by Navigator/Coordinator        Post-Acute Status    Post-Acute Authorization Other     Coverage [employer]     Other Status No Post-Acute Service Needs     Discharge Delays None known at this time                     Important Message from Medicare             Contact Info       Cadence Garcia MD   Specialty: Family Medicine   Relationship: PCP - General    [place] [id number]   Phone: [phone]       Next Steps: Follow up on 6/23/2022    Instructions: appt. time is for 1:45 pm          SW secure chat patient's nurse Julia that patient is cleared from case management standpoint.

## 2022-06-16 NOTE — NURSING
Discharge instructions given to patient and spouse at bedside. Patient verbalized understanding of instructions. Patient states willingness to comply. Saline lock removed. Tele monitoring removed. meds delivered from pharmacy. Return to work notice handed to patient (from Dr. Gregory). Patient escorted by transport to family vehicle for discharge home. Patient accompanied by spouse. No apparent distress noted. All personal belongings with patient

## 2022-06-16 NOTE — PLAN OF CARE
Problem: VTE (Venous Thromboembolism)  Goal: VTE (Venous Thromboembolism) Symptom Resolution  Outcome: Ongoing, Progressing     Problem: Pain Acute  Goal: Acceptable Pain Control and Functional Ability  6/15/2022 1940 by Julia Baum RN  Outcome: Ongoing, Progressing  6/15/2022 1940 by Julia Baum RN  Outcome: Ongoing, Progressing     Problem: Adult Inpatient Plan of Care  Goal: Plan of Care Review  6/15/2022 1940 by Julia Baum RN  Outcome: Ongoing, Progressing  6/15/2022 1940 by Julia Baum RN  Outcome: Ongoing, Progressing  Goal: Patient-Specific Goal (Individualized)  6/15/2022 1940 by Julia Baum RN  Outcome: Ongoing, Progressing  6/15/2022 1940 by Julia Baum RN  Outcome: Ongoing, Progressing  Goal: Absence of Hospital-Acquired Illness or Injury  6/15/2022 1940 by Julia Baum RN  Outcome: Ongoing, Progressing  6/15/2022 1940 by Julia Baum RN  Outcome: Ongoing, Progressing  Goal: Optimal Comfort and Wellbeing  6/15/2022 1940 by Julia Baum RN  Outcome: Ongoing, Progressing  6/15/2022 1940 by Julia Baum RN  Outcome: Ongoing, Progressing  Goal: Readiness for Transition of Care  6/15/2022 1940 by Julia Baum RN  Outcome: Ongoing, Progressing  6/15/2022 1940 by Julia Baum RN  Outcome: Ongoing, Progressing   No falls, safety maintained. Hemonc and OBGyn consults today. Heparin infusion continues. Prn Pain medication given twice today upon request, moderately effective.

## 2022-06-18 LAB
FACT VIII ACT/NOR PPP: 341 % (ref 55–200)
VON WILLEBRAND EVAL PPP-IMP: ABNORMAL
VWF AG ACT/NOR PPP IA: 353 % (ref 55–200)
VWF:AC ACT/NOR PPP IA: 232 % (ref 55–200)

## 2022-06-24 LAB
PROVIDER SIGNING NAME: NORMAL
VON WILLEBRAND EVAL PPP-IMP: NORMAL
VON WILLEBRAND EVAL PPP-IMP: NORMAL
VWF MULTIMERS PPP QL: NORMAL

## 2022-06-30 ENCOUNTER — OFFICE VISIT (OUTPATIENT)
Dept: OBSTETRICS AND GYNECOLOGY | Facility: CLINIC | Age: 47
End: 2022-06-30
Payer: COMMERCIAL

## 2022-06-30 VITALS
BODY MASS INDEX: 31.39 KG/M2 | WEIGHT: 206.44 LBS | SYSTOLIC BLOOD PRESSURE: 122 MMHG | DIASTOLIC BLOOD PRESSURE: 64 MMHG

## 2022-06-30 DIAGNOSIS — Z32.02 NEGATIVE PREGNANCY TEST: ICD-10-CM

## 2022-06-30 DIAGNOSIS — N93.9 ABNORMAL UTERINE BLEEDING (AUB): Primary | ICD-10-CM

## 2022-06-30 PROCEDURE — 3008F PR BODY MASS INDEX (BMI) DOCUMENTED: ICD-10-PCS | Mod: CPTII,S$GLB,, | Performed by: OBSTETRICS & GYNECOLOGY

## 2022-06-30 PROCEDURE — 99213 PR OFFICE/OUTPT VISIT, EST, LEVL III, 20-29 MIN: ICD-10-PCS | Mod: S$GLB,,, | Performed by: OBSTETRICS & GYNECOLOGY

## 2022-06-30 PROCEDURE — 3074F SYST BP LT 130 MM HG: CPT | Mod: CPTII,S$GLB,, | Performed by: OBSTETRICS & GYNECOLOGY

## 2022-06-30 PROCEDURE — 3078F DIAST BP <80 MM HG: CPT | Mod: CPTII,S$GLB,, | Performed by: OBSTETRICS & GYNECOLOGY

## 2022-06-30 PROCEDURE — 3074F PR MOST RECENT SYSTOLIC BLOOD PRESSURE < 130 MM HG: ICD-10-PCS | Mod: CPTII,S$GLB,, | Performed by: OBSTETRICS & GYNECOLOGY

## 2022-06-30 PROCEDURE — 99999 PR PBB SHADOW E&M-EST. PATIENT-LVL III: CPT | Mod: PBBFAC,,, | Performed by: OBSTETRICS & GYNECOLOGY

## 2022-06-30 PROCEDURE — 1111F DSCHRG MED/CURRENT MED MERGE: CPT | Mod: CPTII,S$GLB,, | Performed by: OBSTETRICS & GYNECOLOGY

## 2022-06-30 PROCEDURE — 1111F PR DISCHARGE MEDS RECONCILED W/ CURRENT OUTPATIENT MED LIST: ICD-10-PCS | Mod: CPTII,S$GLB,, | Performed by: OBSTETRICS & GYNECOLOGY

## 2022-06-30 PROCEDURE — 99999 PR PBB SHADOW E&M-EST. PATIENT-LVL III: ICD-10-PCS | Mod: PBBFAC,,, | Performed by: OBSTETRICS & GYNECOLOGY

## 2022-06-30 PROCEDURE — 99213 OFFICE O/P EST LOW 20 MIN: CPT | Mod: S$GLB,,, | Performed by: OBSTETRICS & GYNECOLOGY

## 2022-06-30 PROCEDURE — 3008F BODY MASS INDEX DOCD: CPT | Mod: CPTII,S$GLB,, | Performed by: OBSTETRICS & GYNECOLOGY

## 2022-06-30 PROCEDURE — 3078F PR MOST RECENT DIASTOLIC BLOOD PRESSURE < 80 MM HG: ICD-10-PCS | Mod: CPTII,S$GLB,, | Performed by: OBSTETRICS & GYNECOLOGY

## 2022-07-07 ENCOUNTER — LAB VISIT (OUTPATIENT)
Dept: LAB | Facility: HOSPITAL | Age: 47
End: 2022-07-07
Attending: INTERNAL MEDICINE
Payer: COMMERCIAL

## 2022-07-07 ENCOUNTER — OFFICE VISIT (OUTPATIENT)
Dept: HEMATOLOGY/ONCOLOGY | Facility: CLINIC | Age: 47
End: 2022-07-07
Payer: COMMERCIAL

## 2022-07-07 VITALS
RESPIRATION RATE: 18 BRPM | SYSTOLIC BLOOD PRESSURE: 107 MMHG | HEART RATE: 81 BPM | BODY MASS INDEX: 31.21 KG/M2 | DIASTOLIC BLOOD PRESSURE: 78 MMHG | HEIGHT: 68 IN | WEIGHT: 205.94 LBS

## 2022-07-07 DIAGNOSIS — I26.99 BILATERAL PULMONARY EMBOLISM: Primary | ICD-10-CM

## 2022-07-07 DIAGNOSIS — I26.99 BILATERAL PULMONARY EMBOLISM: ICD-10-CM

## 2022-07-07 DIAGNOSIS — D50.0 IRON DEFICIENCY ANEMIA DUE TO CHRONIC BLOOD LOSS: ICD-10-CM

## 2022-07-07 DIAGNOSIS — Z71.89 ADVANCE CARE PLANNING: ICD-10-CM

## 2022-07-07 LAB
ANISOCYTOSIS BLD QL SMEAR: SLIGHT
BASOPHILS # BLD AUTO: 0.01 K/UL (ref 0–0.2)
BASOPHILS NFR BLD: 0.2 % (ref 0–1.9)
DIFFERENTIAL METHOD: ABNORMAL
EOSINOPHIL # BLD AUTO: 0.1 K/UL (ref 0–0.5)
EOSINOPHIL NFR BLD: 1.5 % (ref 0–8)
ERYTHROCYTE [DISTWIDTH] IN BLOOD BY AUTOMATED COUNT: 22.5 % (ref 11.5–14.5)
FERRITIN SERPL-MCNC: 33 NG/ML (ref 20–300)
HCT VFR BLD AUTO: 33 % (ref 37–48.5)
HGB BLD-MCNC: 9.7 G/DL (ref 12–16)
HYPOCHROMIA BLD QL SMEAR: ABNORMAL
IMM GRANULOCYTES # BLD AUTO: 0.02 K/UL (ref 0–0.04)
IMM GRANULOCYTES NFR BLD AUTO: 0.4 % (ref 0–0.5)
IRON SERPL-MCNC: 230 UG/DL (ref 30–160)
LYMPHOCYTES # BLD AUTO: 1.3 K/UL (ref 1–4.8)
LYMPHOCYTES NFR BLD: 28.3 % (ref 18–48)
MCH RBC QN AUTO: 25.4 PG (ref 27–31)
MCHC RBC AUTO-ENTMCNC: 29.4 G/DL (ref 32–36)
MCV RBC AUTO: 86 FL (ref 82–98)
MONOCYTES # BLD AUTO: 0.5 K/UL (ref 0.3–1)
MONOCYTES NFR BLD: 10.8 % (ref 4–15)
NEUTROPHILS # BLD AUTO: 2.7 K/UL (ref 1.8–7.7)
NEUTROPHILS NFR BLD: 58.8 % (ref 38–73)
NRBC BLD-RTO: 0 /100 WBC
OVALOCYTES BLD QL SMEAR: ABNORMAL
PLATELET # BLD AUTO: 378 K/UL (ref 150–450)
PLATELET BLD QL SMEAR: ABNORMAL
PMV BLD AUTO: 10.6 FL (ref 9.2–12.9)
RBC # BLD AUTO: 3.82 M/UL (ref 4–5.4)
SATURATED IRON: 54 % (ref 20–50)
TOTAL IRON BINDING CAPACITY: 428 UG/DL (ref 250–450)
TRANSFERRIN SERPL-MCNC: 289 MG/DL (ref 200–375)
WBC # BLD AUTO: 4.63 K/UL (ref 3.9–12.7)

## 2022-07-07 PROCEDURE — 99497 PR ADVNCD CARE PLAN 30 MIN: ICD-10-PCS | Mod: S$GLB,,, | Performed by: INTERNAL MEDICINE

## 2022-07-07 PROCEDURE — 86146 BETA-2 GLYCOPROTEIN ANTIBODY: CPT | Performed by: INTERNAL MEDICINE

## 2022-07-07 PROCEDURE — 86147 CARDIOLIPIN ANTIBODY EA IG: CPT | Mod: 59 | Performed by: INTERNAL MEDICINE

## 2022-07-07 PROCEDURE — 1159F MED LIST DOCD IN RCRD: CPT | Mod: CPTII,S$GLB,, | Performed by: INTERNAL MEDICINE

## 2022-07-07 PROCEDURE — 99214 OFFICE O/P EST MOD 30 MIN: CPT | Mod: S$GLB,,, | Performed by: INTERNAL MEDICINE

## 2022-07-07 PROCEDURE — 3008F PR BODY MASS INDEX (BMI) DOCUMENTED: ICD-10-PCS | Mod: CPTII,S$GLB,, | Performed by: INTERNAL MEDICINE

## 2022-07-07 PROCEDURE — 1111F PR DISCHARGE MEDS RECONCILED W/ CURRENT OUTPATIENT MED LIST: ICD-10-PCS | Mod: CPTII,S$GLB,, | Performed by: INTERNAL MEDICINE

## 2022-07-07 PROCEDURE — 81240 F2 GENE: CPT | Performed by: INTERNAL MEDICINE

## 2022-07-07 PROCEDURE — 99214 PR OFFICE/OUTPT VISIT, EST, LEVL IV, 30-39 MIN: ICD-10-PCS | Mod: S$GLB,,, | Performed by: INTERNAL MEDICINE

## 2022-07-07 PROCEDURE — 85670 THROMBIN TIME PLASMA: CPT | Performed by: INTERNAL MEDICINE

## 2022-07-07 PROCEDURE — 1160F RVW MEDS BY RX/DR IN RCRD: CPT | Mod: CPTII,S$GLB,, | Performed by: INTERNAL MEDICINE

## 2022-07-07 PROCEDURE — 3078F PR MOST RECENT DIASTOLIC BLOOD PRESSURE < 80 MM HG: ICD-10-PCS | Mod: CPTII,S$GLB,, | Performed by: INTERNAL MEDICINE

## 2022-07-07 PROCEDURE — 85598 HEXAGNAL PHOSPH PLTLT NEUTRL: CPT | Performed by: INTERNAL MEDICINE

## 2022-07-07 PROCEDURE — 1159F PR MEDICATION LIST DOCUMENTED IN MEDICAL RECORD: ICD-10-PCS | Mod: CPTII,S$GLB,, | Performed by: INTERNAL MEDICINE

## 2022-07-07 PROCEDURE — 99999 PR PBB SHADOW E&M-EST. PATIENT-LVL IV: CPT | Mod: PBBFAC,,, | Performed by: INTERNAL MEDICINE

## 2022-07-07 PROCEDURE — 85610 PROTHROMBIN TIME: CPT | Performed by: INTERNAL MEDICINE

## 2022-07-07 PROCEDURE — 84466 ASSAY OF TRANSFERRIN: CPT | Performed by: INTERNAL MEDICINE

## 2022-07-07 PROCEDURE — 3074F PR MOST RECENT SYSTOLIC BLOOD PRESSURE < 130 MM HG: ICD-10-PCS | Mod: CPTII,S$GLB,, | Performed by: INTERNAL MEDICINE

## 2022-07-07 PROCEDURE — 3078F DIAST BP <80 MM HG: CPT | Mod: CPTII,S$GLB,, | Performed by: INTERNAL MEDICINE

## 2022-07-07 PROCEDURE — 36415 COLL VENOUS BLD VENIPUNCTURE: CPT | Performed by: INTERNAL MEDICINE

## 2022-07-07 PROCEDURE — 3074F SYST BP LT 130 MM HG: CPT | Mod: CPTII,S$GLB,, | Performed by: INTERNAL MEDICINE

## 2022-07-07 PROCEDURE — 3008F BODY MASS INDEX DOCD: CPT | Mod: CPTII,S$GLB,, | Performed by: INTERNAL MEDICINE

## 2022-07-07 PROCEDURE — 99497 ADVNCD CARE PLAN 30 MIN: CPT | Mod: S$GLB,,, | Performed by: INTERNAL MEDICINE

## 2022-07-07 PROCEDURE — 99999 PR PBB SHADOW E&M-EST. PATIENT-LVL IV: ICD-10-PCS | Mod: PBBFAC,,, | Performed by: INTERNAL MEDICINE

## 2022-07-07 PROCEDURE — 1111F DSCHRG MED/CURRENT MED MERGE: CPT | Mod: CPTII,S$GLB,, | Performed by: INTERNAL MEDICINE

## 2022-07-07 PROCEDURE — 81241 F5 GENE: CPT | Performed by: INTERNAL MEDICINE

## 2022-07-07 PROCEDURE — 82728 ASSAY OF FERRITIN: CPT | Performed by: INTERNAL MEDICINE

## 2022-07-07 PROCEDURE — 85025 COMPLETE CBC W/AUTO DIFF WBC: CPT | Performed by: INTERNAL MEDICINE

## 2022-07-07 PROCEDURE — 1160F PR REVIEW ALL MEDS BY PRESCRIBER/CLIN PHARMACIST DOCUMENTED: ICD-10-PCS | Mod: CPTII,S$GLB,, | Performed by: INTERNAL MEDICINE

## 2022-07-07 PROCEDURE — 85732 THROMBOPLASTIN TIME PARTIAL: CPT | Performed by: INTERNAL MEDICINE

## 2022-07-07 PROCEDURE — 85613 RUSSELL VIPER VENOM DILUTED: CPT | Mod: 91 | Performed by: INTERNAL MEDICINE

## 2022-07-07 NOTE — PROGRESS NOTES
"PATIENT: Sandra Rodriguez  MRN: [id number]  DATE: 7/7/2022    Diagnosis:   1. Bilateral pulmonary embolism    2. Iron deficiency anemia due to chronic blood loss    3. Advance care planning        Chief Complaint: pulmonary embolism      Oncologic History:      Oncologic History     Oncologic Treatment     Pathology       Subjective:    History of Present Illness: Ms. Rodriguez is a 46 y.o. female who presents for evaluation and management of pulmonary emboli.    - she injured her right leg in February 2022.  - in June 2022, she was diagnosed with a deep vein thrombosis (outside imaging). 1-2 days later she presented to the emergency room with shortness of breath and chest pain.  - CTA chest (6/14/22) revealed "Acute bilateral pulmonary emboli."  - she was seen by Dr. Acevedo while in the hospital. Dr. Acevedo recommended an outpatient hypercoagulable workup.  - she was placed on apixaban  - today, she is doing well. She denies shortness of breath, chest pain, nausea, vomiting, diarrhea, constipation.        Past medical, surgical, family, and social histories have been reviewed and updated below.    Past Medical History:   Past Medical History:   Diagnosis Date    DVT (deep venous thrombosis)        Past Surgical History:   Past Surgical History:   Procedure Laterality Date    TUBAL LIGATION         Family History:   Family History   Problem Relation Age of Onset    Breast cancer Neg Hx     Colon cancer Neg Hx     Ovarian cancer Neg Hx        Social History:  reports that she has never smoked. She has never used smokeless tobacco. She reports that she does not drink alcohol and does not use drugs.    Allergies:  Review of patient's allergies indicates:  No Known Allergies    Medications:  Current Outpatient Medications   Medication Sig Dispense Refill    ELIQUIS 5 mg Tab Take 2 tabs (10 mg) bid until 6/18, then start taking 1 tab (5 mg) bid on 6/19      ferrous gluconate (FERGON) 324 MG tablet Take 1 tablet (324 mg " "total) by mouth 2 (two) times daily with meals. 60 tablet 11    oxyCODONE-acetaminophen (PERCOCET) 5-325 mg per tablet Take 1 tablet by mouth every 8 (eight) hours as needed for Pain. 10 tablet 0     No current facility-administered medications for this visit.       Review of Systems   Constitutional: Negative for fatigue.   HENT: Negative for sore throat.    Eyes: Negative for visual disturbance.   Respiratory: Negative for cough and shortness of breath.    Cardiovascular: Negative for chest pain.   Gastrointestinal: Negative for abdominal pain, constipation, diarrhea, nausea and vomiting.   Genitourinary: Negative for dysuria.   Musculoskeletal: Negative for back pain.   Skin: Negative for rash.   Neurological: Negative for headaches.   Hematological: Negative for adenopathy.   Psychiatric/Behavioral: The patient is not nervous/anxious.        ECOG Performance Status:   ECOG SCORE    0 - Fully active-able to carry on all pre-disease performance without restriction         Objective:      Vitals:   Vitals:    07/07/22 1054   BP: 107/78   BP Location: Left arm   Patient Position: Sitting   BP Method: Large (Automatic)   Pulse: 81   Resp: 18   Weight: 93.4 kg (205 lb 14.6 oz)   Height: 5' 8" (1.727 m)     BMI: Body mass index is 31.31 kg/m².    Physical Exam  Vitals and nursing note reviewed.   Constitutional:       Appearance: She is well-developed.   HENT:      Head: Normocephalic and atraumatic.   Eyes:      Pupils: Pupils are equal, round, and reactive to light.   Cardiovascular:      Rate and Rhythm: Normal rate and regular rhythm.   Pulmonary:      Effort: Pulmonary effort is normal.      Breath sounds: Normal breath sounds.   Abdominal:      General: Bowel sounds are normal.      Palpations: Abdomen is soft.   Musculoskeletal:         General: Normal range of motion.      Cervical back: Normal range of motion and neck supple.   Skin:     General: Skin is warm and dry.   Neurological:      Mental Status: She " is alert and oriented to person, place, and time.   Psychiatric:         Behavior: Behavior normal.         Thought Content: Thought content normal.         Judgment: Judgment normal.         Laboratory Data:  Labs have been reviewed.    Lab Results   Component Value Date    WBC 7.22 06/16/2022    HGB 5.8 (LL) 06/16/2022    HCT 20.8 (L) 06/16/2022    MCV 82 06/16/2022     06/16/2022           Imaging:     CTA chest (6/14/22):  Pulmonary embolism: Acute bilateral pulmonary emboli are noted, with the central most filling defects in the left main pulmonary artery approximately 28 mm from the bifurcation.  Additional bilateral lobar, interlobar, segmental, and subsegmental emboli are noted, eccentric to the left.     Central pulmonary arteries: Normal caliber.     Aorta: Normal caliber.     Heart: Equivocal flattening of the interventricular septum.     Coronary arteries: No calcifications.     Pericardium: Normal. No effusion, thickening, or calcification.     Support tubes and lines: None.     Base of neck/thyroid: Normal.     Lymph nodes: No supraclavicular, axillary, internal mammary, mediastinal, or hilar adenopathy.     Esophagus: Normal.     Pleura: No effusion, thickening, or calcification.     Upper abdomen: Simple cyst is noted in the right hepatic lobe.  Ill-defined focus of hyperattenuation/enhancement measuring 12 mm in the right hepatic lobe (series 2, image 171).     Body wall: Unremarkable     Airways: Central airways appear patent.     Lungs: Assessment of the lungs is substantially compromised by respiratory motion.  Dependent atelectasis is suggested.     Bones: No definite acute findings.     Impression:     1. Acute bilateral pulmonary emboli are noted, with the central most filling defects in the left main pulmonary artery approximately 28 mm from the bifurcation.  Additional bilateral lobar, interlobar, segmental, and subsegmental emboli are noted, eccentric to the left. Equivocal  "flattening of the interventricular septum, raising the possibility of right heart strain.  2. Limited assessment of the lung parenchyma without definite acute abnormality.  3. Ill-defined focus of hyperattenuation/enhancement measuring 12 mm in the right hepatic lobe.  This could represent transient vascular phenomenon versus true enhancing lesion.        Assessment:       1. Bilateral pulmonary embolism    2. Iron deficiency anemia due to chronic blood loss    3. Advance care planning           Plan:     1. Bilateral pulmonary embolism   - I have reviewed her chart  - she injured her right leg in February 2022.  - in June 2022, she was diagnosed with a deep vein thrombosis (outside imaging). 1-2 days later she presented to the emergency room with shortness of breath and chest pain.  - CTA chest (6/14/22) revealed "Acute bilateral pulmonary emboli."  - she was seen by Dr. Acevedo while in the hospital. Dr. Acevedo recommended an outpatient hypercoagulable workup.  - she was placed on apixaban  - will check lupus anticoagulant, cardiolipin antibodies, beta2 glycoprotein antibodies, FVL mutation, prothrombin gene mutation. Testing for other hypercoagulable diseases is not recommended in the setting of an acute clot and active anticoagulation  - recommend at least 6 months of full-dose anticoagulation. Options in December 2022 would be to discontinue apixaban (risk of recurrence between 3-4% and 10-11% depending on results of d-dimer testing if we draw that in December 2022 while off anticoagulation)   - return to clinic in December 2022 to discuss long-term anticoagulation vs checking d-dimer and perhaps stopping apixaban.    2. Iron deficiency anemia  - she received IV iron during her hospitalization in June 2022.  - check iron studies today.  - continue oral iron    3. Advance Care Planning     Date: 07/07/2022    Power of   I initiated the process of advance care planning today and explained the importance " of this process to the patient.  I introduced the concept of advance directives to the patient, as well. Then the patient received detailed information about the importance of designating a Health Care Power of  (HCPOA). She was also instructed to communicate with this person about their wishes for future healthcare, should she become sick and lose decision-making capacity. The patient has not previously appointed a HCPOA. After our discussion, the patient has decided to complete a HCPOA and has appointed her  Long Rodriguez ([phone]). I spent a total time of 16 minutes discussing this issue with the patient.          - return to clinic in December 2022 to discuss long-term anticoagulation vs checking d-dimer and perhaps stopping apixaban.    Lamont Deng M.D.  Hematology/Oncology  [facility] - [address], [address]  [place] [address]  Phone: [phone]  Fax: [phone]

## 2022-07-10 PROBLEM — N93.9 VAGINAL BLEEDING: Status: RESOLVED | Noted: 2017-05-03 | Resolved: 2022-07-10

## 2022-07-10 PROBLEM — N93.9 ABNORMAL UTERINE BLEEDING (AUB): Status: ACTIVE | Noted: 2022-07-10

## 2022-07-11 LAB
CARDIOLIPIN IGG SER IA-ACNC: <9.4 GPL (ref 0–14.99)
CARDIOLIPIN IGM SER IA-ACNC: 11.4 MPL (ref 0–12.49)
F2 C.20210G>A GENO BLD/T: NEGATIVE
F5 P.R506Q BLD/T QL: NEGATIVE

## 2022-07-11 NOTE — PROGRESS NOTES
[facility]  Ambulatory Clinic  Obstetrics & Gynecology    Visit Date:  2022    Chief Complaint:  Heavy menses    History of Present Illness:      Sandra Rodriguez is a 46 y.o. , new pt to me, here for follow up from hospital for heavy menses.    Pt was recent admitted on 22 for unprovoked PE/DVT and is on oral anticoagulation.    Pt reports heavy menses for many years and denies recent treatment for her AUB.    Menses are monthly, just heavy, passage of large blood clots, and crampy.    Pt has chronic severe anemia and declined blood transfusion during recent hospital admission and is on IV Fe therapy.    Pt denies sxs of anemia today.    Pt current method of family planning is tubal ligation.    Last pap ~ benign.    Pt denies active sexually transmitted infections.    Last mammo ~ benign per pt at outside facility.    Pt performs monthly self breast examination, non-smoker, uses seat belts, and denies abuse.     Pt denies vaginal discharge, dysmenorrhea, dyspareunia, pelvic pain, bloating, early satiety, unintentional weight loss, breast mass/skin changes, incontinence, GI or urinary complaints.      Otherwise, the pt is in her usual state of health.    Past History:  Gynecologic history as noted above.    Review of Systems:      GENERAL:  No fever, fatigue, excessive weight gain or loss  HEENT:  No headaches, hearing changes, visual disturbance  RESPIRATORY:  Recent DVT and PE  CARDIOVASCULAR:  No chest pain, heart palpitations, leg swelling  BREAST:  No lump, pain, nipple discharge, skin changes  GASTROINTESTINAL:  No nausea, vomiting, constipation, diarrhea, abd pain, rectal bleeding   GENITOURINARY:  See HPI  ENDOCRINE:  No heat or cold intolerance  HEMATOLOGIC:  No easy bruisability or bleeding   LYMPHATICS:  No enlarged nodes  MUSCULOSKELETAL:  No acute joint pain or swelling  SKIN:  No rash, lesions, jaundice  NEUROLOGIC:  No dizziness, weakness,  syncope  PSYCHIATRIC:  No significant mood changes, homicidal/suicidal ideations    Physical Exam:     /64   Wt 93.6 kg (206 lb 7.4 oz)   LMP 2022   BMI 31.39 kg/m²   Pulse 60's, Resp rate 18     GENERAL:  No acute distress, well-nourished  HEENT:  Atraumatic, anicteric, moist mucus membranes. Neck supple w/o masses.  BREAST:  Symmetric, nontender, no obvious masses, adenopathy, skin changes or nipple discharge.  LUNGS:  Clear normal respiratory effort  HEART:  Regular rate and rhythm, no murmurs, gallops, or rubs  ABDOMEN:  Soft, non-tender, non-distended, normoactive bowel sounds, no obvious organomegaly  EXT:  Symmetric w/o cramping, claudication, or edema. +2 distal pulses.  SKIN:  No rashes or bruising  PSYCH:  Mood and affect appropriate  NEURO:  Grossly intact bilaterally     GENITOURINARY:    VULVAR:  Female external genitalia w/o obvious lesions. Female hair distribution. Normal urethral meatus. No gross lymphadenopathy. VAGINA:  Well estrogenized with good rugation. Normal lubrication. Good support. No obvious lesion. No discharge.  CERVIX:  No cervical motion tenderness, discharge, or obvious lesions.   UTERUS:  Small, non-tender, normal contour  ADNEXA:  No masses, non-tender    RECTAL:  Deferred. No obvious external lesions    Chaperone present for exam.    Assessment:     46 y.o.  with h/o BTL:    1. Abnormal uterine bleeding    Plan:    A gynecologic health assessment was performed with age appropriate counseling.    Cervical cancer screening - pap up to date.    STI screening - pt declined.      We discussed natural course of abnormal uterine bleeding including various mgt options.  After an extensive discussion, pt is interested in Mirena IUD.  Risks, benefits, and alternatives to Mirena discussed. Risk of VTE with Mirena IUD discussed.  Pt is not interested in surgical mgt at this time.  Order pelvic u/s.  Endometrial biopsy next visit.  Bleeding/pelvic/anemia precautions.  Fe  supplementation.  F/u with heme onc for PE/DVT and anemia.    Encourage healthy lifestyle modifications.    F/u with PCP for health maintenance.    Return 2 wks for EMB or sooner as needed.  All questions answered, pt voiced understanding.        Edi Jefferson MD

## 2022-07-12 LAB
B2 GLYCOPROT1 IGA SER QL: 54 SAU
B2 GLYCOPROT1 IGG SER QL: <9 SGU
B2 GLYCOPROT1 IGM SER QL: <9 SMU

## 2022-07-20 LAB
APTT IMM NP PPP: 45 SEC (ref 32–48)
APTT P HEP NEUT PPP: ABNORMAL SEC (ref 32–48)
CONFIRM APTT STACLOT: NEGATIVE
DRVVT SCREEN TO CONFIRM RATIO: NEGATIVE RATIO
LA 3 SCREEN W REFLEX-IMP: ABNORMAL
LA NT DPL PPP QL: ABNORMAL
MIXING DRVVT: 46 SEC (ref 33–44)
PROTHROMBIN TIME: 14.7 SEC (ref 12–15.5)
REPTILASE TIME: ABNORMAL SEC
SCREEN APTT: 53 SEC (ref 32–48)
SCREEN DRVVT: 57 SEC (ref 33–44)
THROMBIN TIME: 18.3 SEC (ref 14.7–19.5)

## 2022-12-06 NOTE — PROGRESS NOTES
"PATIENT: Sandra Rodriguez  MRN: [id number]  DATE: 12/7/2022    Diagnosis:   1. History of pulmonary embolism    2. Iron deficiency anemia due to chronic blood loss        Chief Complaint: history of pulmonary embolism      Oncologic History:      Oncologic History     Oncologic Treatment     Pathology       Telemedicine visit:  The patient location is: home  The chief complaint leading to consultation is: pulmonary embolism    Visit type: audiovisual    Face to Face time with patient: 10 minutes  15 minutes of total time spent on the encounter, which includes face to face time and non-face to face time preparing to see the patient (eg, review of tests), Obtaining and/or reviewing separately obtained history, Documenting clinical information in the electronic or other health record, Independently interpreting results (not separately reported) and communicating results to the patient/family/caregiver, or Care coordination (not separately reported).     Each patient to whom he or she provides medical services by telemedicine is:  (1) informed of the relationship between the physician and patient and the respective role of any other health care provider with respect to management of the patient; and (2) notified that he or she may decline to receive medical services by telemedicine and may withdraw from such care at any time.    Notes: see below      Subjective:    History of Present Illness: Ms. Rodriguez is a 47 y.o. female who presented in July 2022 for evaluation and management of pulmonary emboli.    - she injured her right leg in February 2022.  - in June 2022, she was diagnosed with a deep vein thrombosis (outside imaging). 1-2 days later she presented to the emergency room with shortness of breath and chest pain.  - CTA chest (6/14/22) revealed "Acute bilateral pulmonary emboli."  - she was seen by Dr. Acevedo while in the hospital. Dr. Acevedo recommended an outpatient hypercoagulable workup.      Interval History:  - " she presents for a follow-up appointment for her history of pulmonary embolism.  - today, she is doing well. She denies shortness of breath, chest pain, nausea, vomiting, diarrhea, constipation.  - she is still taking apixaban.        Past medical, surgical, family, and social histories have been reviewed and updated below.    Past Medical History:   Past Medical History:   Diagnosis Date    DVT (deep venous thrombosis)        Past Surgical History:   Past Surgical History:   Procedure Laterality Date    TUBAL LIGATION         Family History:   Family History   Problem Relation Age of Onset    Breast cancer Neg Hx     Colon cancer Neg Hx     Ovarian cancer Neg Hx        Social History:  reports that she has never smoked. She has never used smokeless tobacco. She reports that she does not drink alcohol and does not use drugs.    Allergies:  Review of patient's allergies indicates:  No Known Allergies    Medications:  Current Outpatient Medications   Medication Sig Dispense Refill    apixaban (ELIQUIS) 5 mg Tab Take 1 tablet (5 mg total) by mouth 2 (two) times daily. 60 tablet 5    ferrous gluconate (FERGON) 324 MG tablet Take 1 tablet (324 mg total) by mouth 2 (two) times daily with meals. 60 tablet 11    oxyCODONE-acetaminophen (PERCOCET) 5-325 mg per tablet Take 1 tablet by mouth every 8 (eight) hours as needed for Pain. (Patient not taking: Reported on 7/7/2022) 10 tablet 0     No current facility-administered medications for this visit.       Review of Systems   Constitutional:  Negative for fatigue.   HENT:  Negative for sore throat.    Eyes:  Negative for visual disturbance.   Respiratory:  Negative for cough and shortness of breath.    Cardiovascular:  Negative for chest pain.   Gastrointestinal:  Negative for abdominal pain, constipation, diarrhea, nausea and vomiting.   Genitourinary:  Negative for dysuria.   Musculoskeletal:  Negative for back pain.   Skin:  Negative for rash.   Neurological:  Negative for  headaches.   Hematological:  Negative for adenopathy.   Psychiatric/Behavioral:  The patient is not nervous/anxious.      ECOG Performance Status:   ECOG SCORE 0            Objective:      Vitals:   There were no vitals filed for this visit.    BMI: There is no height or weight on file to calculate BMI.  Deferred due to telemedicine visit.    Physical Exam  Deferred due to telemedicine visit.    Laboratory Data:  Labs have been reviewed.    Lab Results   Component Value Date    WBC 4.63 07/07/2022    HGB 9.7 (L) 07/07/2022    HCT 33.0 (L) 07/07/2022    MCV 86 07/07/2022     07/07/2022     Ferritin (7/7/22): 33 ng/mL.      Imaging:     CTA chest (6/14/22):  Pulmonary embolism: Acute bilateral pulmonary emboli are noted, with the central most filling defects in the left main pulmonary artery approximately 28 mm from the bifurcation.  Additional bilateral lobar, interlobar, segmental, and subsegmental emboli are noted, eccentric to the left.     Central pulmonary arteries: Normal caliber.     Aorta: Normal caliber.     Heart: Equivocal flattening of the interventricular septum.     Coronary arteries: No calcifications.     Pericardium: Normal. No effusion, thickening, or calcification.     Support tubes and lines: None.     Base of neck/thyroid: Normal.     Lymph nodes: No supraclavicular, axillary, internal mammary, mediastinal, or hilar adenopathy.     Esophagus: Normal.     Pleura: No effusion, thickening, or calcification.     Upper abdomen: Simple cyst is noted in the right hepatic lobe.  Ill-defined focus of hyperattenuation/enhancement measuring 12 mm in the right hepatic lobe (series 2, image 171).     Body wall: Unremarkable     Airways: Central airways appear patent.     Lungs: Assessment of the lungs is substantially compromised by respiratory motion.  Dependent atelectasis is suggested.     Bones: No definite acute findings.     Impression:     1. Acute bilateral pulmonary emboli are noted, with the  "central most filling defects in the left main pulmonary artery approximately 28 mm from the bifurcation.  Additional bilateral lobar, interlobar, segmental, and subsegmental emboli are noted, eccentric to the left. Equivocal flattening of the interventricular septum, raising the possibility of right heart strain.  2. Limited assessment of the lung parenchyma without definite acute abnormality.  3. Ill-defined focus of hyperattenuation/enhancement measuring 12 mm in the right hepatic lobe.  This could represent transient vascular phenomenon versus true enhancing lesion.        Assessment:       1. History of pulmonary embolism    2. Iron deficiency anemia due to chronic blood loss           Plan:     1. Bilateral pulmonary embolism   - I have reviewed her chart  - she injured her right leg in February 2022.  - in June 2022, she was diagnosed with a deep vein thrombosis (outside imaging). 1-2 days later she presented to the emergency room with shortness of breath and chest pain.  - CTA chest (6/14/22) revealed "Acute bilateral pulmonary emboli."  - she was seen by Dr. Acevedo while in the hospital. Dr. Acevedo recommended an outpatient hypercoagulable workup.  - she was placed on apixaban  - factor 5 leiden, prothrombin were negative. Beta 2 glycoprotein was elevated of unclear significance. Suspicion for antiphospholipid syndrome is low.   - we discussed discontinuation of apixaban vs transition to prophylactic dosing. After discussion, she will stop apixaban and we will check a d-dimer in 2 weeks.  - she understands that her risk of recurrent blood clot is not 0% but between 3-4% and 10-11% depending on results of d-dimer testing  - I will contact with results of d-dimer testing.    2. Iron deficiency anemia  - she received IV iron during her hospitalization in June 2022.  - iron studies (7/7/22) revealed a low-normal ferritin.  - continue oral iron    3. Advance Care Planning     Power of   After our " discussion (at previous visit), the patient decided to complete a HCPOA and appointed her   Long Rodriguez ([phone]) .           - I will contact with results of d-dimer testing.    Lamont Deng M.D.  Hematology/Oncology  [facility] - [address], [address]  [place] [address]  Phone: [phone]  Fax: [phone]

## 2022-12-07 ENCOUNTER — OFFICE VISIT (OUTPATIENT)
Dept: HEMATOLOGY/ONCOLOGY | Facility: CLINIC | Age: 47
End: 2022-12-07
Payer: COMMERCIAL

## 2022-12-07 DIAGNOSIS — D50.0 IRON DEFICIENCY ANEMIA DUE TO CHRONIC BLOOD LOSS: ICD-10-CM

## 2022-12-07 DIAGNOSIS — Z86.711 HISTORY OF PULMONARY EMBOLISM: Primary | ICD-10-CM

## 2022-12-07 PROCEDURE — 99214 PR OFFICE/OUTPT VISIT, EST, LEVL IV, 30-39 MIN: ICD-10-PCS | Mod: 95,,, | Performed by: INTERNAL MEDICINE

## 2022-12-07 PROCEDURE — 1160F PR REVIEW ALL MEDS BY PRESCRIBER/CLIN PHARMACIST DOCUMENTED: ICD-10-PCS | Mod: CPTII,95,, | Performed by: INTERNAL MEDICINE

## 2022-12-07 PROCEDURE — 1159F PR MEDICATION LIST DOCUMENTED IN MEDICAL RECORD: ICD-10-PCS | Mod: CPTII,95,, | Performed by: INTERNAL MEDICINE

## 2022-12-07 PROCEDURE — 1159F MED LIST DOCD IN RCRD: CPT | Mod: CPTII,95,, | Performed by: INTERNAL MEDICINE

## 2022-12-07 PROCEDURE — 99214 OFFICE O/P EST MOD 30 MIN: CPT | Mod: 95,,, | Performed by: INTERNAL MEDICINE

## 2022-12-07 PROCEDURE — 1160F RVW MEDS BY RX/DR IN RCRD: CPT | Mod: CPTII,95,, | Performed by: INTERNAL MEDICINE

## 2023-04-04 ENCOUNTER — PATIENT MESSAGE (OUTPATIENT)
Dept: RESEARCH | Facility: HOSPITAL | Age: 48
End: 2023-04-04
Payer: COMMERCIAL

## 2024-03-26 ENCOUNTER — OFFICE VISIT (OUTPATIENT)
Dept: OBSTETRICS AND GYNECOLOGY | Facility: CLINIC | Age: 49
End: 2024-03-26
Payer: COMMERCIAL

## 2024-03-26 VITALS
BODY MASS INDEX: 32.21 KG/M2 | SYSTOLIC BLOOD PRESSURE: 130 MMHG | WEIGHT: 212.5 LBS | HEIGHT: 68 IN | DIASTOLIC BLOOD PRESSURE: 92 MMHG

## 2024-03-26 DIAGNOSIS — Z01.419 ENCOUNTER FOR WELL WOMAN EXAM: Primary | ICD-10-CM

## 2024-03-26 DIAGNOSIS — Z12.31 BREAST CANCER SCREENING BY MAMMOGRAM: ICD-10-CM

## 2024-03-26 DIAGNOSIS — Z12.11 COLON CANCER SCREENING: ICD-10-CM

## 2024-03-26 PROCEDURE — 3075F SYST BP GE 130 - 139MM HG: CPT | Mod: CPTII,S$GLB,, | Performed by: FAMILY MEDICINE

## 2024-03-26 PROCEDURE — 1160F RVW MEDS BY RX/DR IN RCRD: CPT | Mod: CPTII,S$GLB,, | Performed by: FAMILY MEDICINE

## 2024-03-26 PROCEDURE — 99999 PR PBB SHADOW E&M-EST. PATIENT-LVL III: CPT | Mod: PBBFAC,,, | Performed by: FAMILY MEDICINE

## 2024-03-26 PROCEDURE — 3080F DIAST BP >= 90 MM HG: CPT | Mod: CPTII,S$GLB,, | Performed by: FAMILY MEDICINE

## 2024-03-26 PROCEDURE — 99396 PREV VISIT EST AGE 40-64: CPT | Mod: S$GLB,,, | Performed by: FAMILY MEDICINE

## 2024-03-26 PROCEDURE — 1159F MED LIST DOCD IN RCRD: CPT | Mod: CPTII,S$GLB,, | Performed by: FAMILY MEDICINE

## 2024-03-26 PROCEDURE — 3008F BODY MASS INDEX DOCD: CPT | Mod: CPTII,S$GLB,, | Performed by: FAMILY MEDICINE

## 2024-03-26 NOTE — PROGRESS NOTES
HISTORY OF PRESENT ILLNESS:    Sandra Rodriguez is a 48 y.o. female, , Patient's last menstrual period was 2024 (exact date).,  presents for a routine annual exam .   Last pap:   NL , possible leep in   Last mammogram: 2019 right breast mass benign  Last colon ca screening:  has not had yet   SA: male partner, does not use condoms  Contraception: tubal ligation.    Sexually transmitted infection risk: very low risk of STD exposure.   Menstrual flow: regular, usually changes pad every 20 or so minutes as she doesn't like sitting in blood but it is not fully soaked. She will monitor if saturating pad in 1 hr. No dizziness/weakness. Dr. Jefferson previously recommended embx and mirena however she did not do this. Discussed reasoning. Advised to consider if bleeding getting heavier (saturating pad/hour) or irregular. Hx of fibroids  -no breast pain uti sx abnormal vd  This is the extent of the patient's complaints at this time.     Past Medical History:   Diagnosis Date    DVT (deep venous thrombosis)     Fibroids        Past Surgical History:   Procedure Laterality Date    TUBAL LIGATION         MEDICATIONS AND ALLERGIES:      Current Outpatient Medications:     apixaban (ELIQUIS) 5 mg Tab, Take 1 tablet (5 mg total) by mouth 2 (two) times daily. (Patient not taking: Reported on 3/26/2024), Disp: 60 tablet, Rfl: 5    ferrous gluconate (FERGON) 324 MG tablet, Take 1 tablet (324 mg total) by mouth 2 (two) times daily with meals., Disp: 60 tablet, Rfl: 11    oxyCODONE-acetaminophen (PERCOCET) 5-325 mg per tablet, Take 1 tablet by mouth every 8 (eight) hours as needed for Pain. (Patient not taking: Reported on 2022), Disp: 10 tablet, Rfl: 0    Review of patient's allergies indicates:  No Known Allergies    Family History   Problem Relation Age of Onset    Breast cancer Neg Hx     Colon cancer Neg Hx     Ovarian cancer Neg Hx        Social History     Socioeconomic History    Marital status:   "  Tobacco Use    Smoking status: Never    Smokeless tobacco: Never   Substance and Sexual Activity    Alcohol use: Not Currently    Drug use: No    Sexual activity: Yes     Partners: Male     Birth control/protection: Surgical, See Surgical Hx       OB History    Para Term  AB Living   2 2 2     2   SAB IAB Ectopic Multiple Live Births           2      # Outcome Date GA Lbr Barak/2nd Weight Sex Delivery Anes PTL Lv   2 Term            1 Term                   COMPREHENSIVE GYN HISTORY:  PAP History: + abnormal Paps.  Infection History: Denies STDs. Denies PID.  Benign History: Denies uterine fibroids. Denies ovarian cysts. Denies endometriosis. Denies other conditions.  Cancer History: Denies cervical cancer. Denies uterine cancer or hyperplasia. Denies ovarian cancer. Denies vulvar cancer or pre-cancer. Denies vaginal cancer or pre-cancer. Denies breast cancer. Denies colon cancer.      ROS:  GENERAL: No weight changes. No swelling. No fatigue. No fever.  BREASTS: No pain. No lumps. No discharge.  ABDOMEN: No pain. No nausea. No vomiting. No diarrhea. No constipation.  REPRODUCTIVE: +menorrhagia. +dysmenorrhea  VULVA: No pain. No lesions. No itching.  VAGINA: No relaxation. No itching. No odor. No discharge. No lesions.  URINARY: No incontinence. No nocturia. No frequency. No dysuria.    BP (!) 130/92   Ht 5' 8" (1.727 m)   Wt 96.4 kg (212 lb 8.4 oz)   LMP 2024 (Exact Date)   BMI 32.31 kg/m²     PE:  Physical Exam:   Constitutional: She is oriented to person, place, and time. She appears well-developed and well-nourished. No distress.      Neck: No thyroid mass and no thyromegaly present.     Pulmonary/Chest: Right breast exhibits no inverted nipple, no mass, no nipple discharge, no skin change, no tenderness and no bleeding. Left breast exhibits no inverted nipple, no mass, no nipple discharge, no skin change, no tenderness and no bleeding.        Abdominal: Soft. There is no abdominal " tenderness.     Genitourinary:    Vagina, uterus, right adnexa and left adnexa normal.      Pelvic exam was performed with patient in the lithotomy position.   The external female genitalia was normal.   Genitalia hair distrobution normal .   There is no rash or lesion on the right labia. There is no rash or lesion on the left labia. Cervix is normal. Right adnexum displays no mass, no tenderness and no fullness. Left adnexum displays no mass, no tenderness and no fullness. No vaginal discharge, tenderness, bleeding, rectocele, cystocele or prolapse of vaginal walls in the vagina.    No foreign body in the vagina.   Cervix exhibits no motion tenderness, no lesion, no discharge, no friability, no tenderness and no polyp.    pap smear not completedUterus is not enlarged and not tender. Normal urethral meatus.              Neurological: She is alert and oriented to person, place, and time.          PROCEDURES/ORDERS:  Pap-UTD    Assessment/Plan:    Encounter for well woman exam    Breast cancer screening by mammogram  -     Mammo Digital Screening Bilat w/ Tony; Future; Expected date: 03/26/2024    Colon cancer screening  -     Ambulatory referral/consult to Endo Procedure ; Future; Expected date: 03/27/2024        COUNSELING:  The patient was counseled today on:  -A.C.S. Pap and pelvic exam guidelines, recomendations for yearly mammogram, monthly self breast exams and to follow up with her PCP for other health maintenance.    FOLLOW-UP  annually for [employer].

## 2025-04-04 ENCOUNTER — TELEPHONE (OUTPATIENT)
Dept: OBSTETRICS AND GYNECOLOGY | Facility: CLINIC | Age: 50
End: 2025-04-04
Payer: COMMERCIAL

## 2025-04-04 ENCOUNTER — PATIENT MESSAGE (OUTPATIENT)
Dept: OBSTETRICS AND GYNECOLOGY | Facility: CLINIC | Age: 50
End: 2025-04-04
Payer: COMMERCIAL

## 2025-04-21 ENCOUNTER — OFFICE VISIT (OUTPATIENT)
Dept: OBSTETRICS AND GYNECOLOGY | Facility: CLINIC | Age: 50
End: 2025-04-21
Payer: COMMERCIAL

## 2025-04-21 VITALS
DIASTOLIC BLOOD PRESSURE: 78 MMHG | WEIGHT: 220.88 LBS | BODY MASS INDEX: 33.59 KG/M2 | SYSTOLIC BLOOD PRESSURE: 148 MMHG

## 2025-04-21 DIAGNOSIS — Z01.419 ENCOUNTER FOR GYNECOLOGICAL EXAMINATION WITHOUT ABNORMAL FINDING: Primary | ICD-10-CM

## 2025-04-21 DIAGNOSIS — Z12.31 BREAST CANCER SCREENING BY MAMMOGRAM: ICD-10-CM

## 2025-04-21 PROCEDURE — 88175 CYTOPATH C/V AUTO FLUID REDO: CPT

## 2025-04-21 PROCEDURE — 87624 HPV HI-RISK TYP POOLED RSLT: CPT

## 2025-04-21 PROCEDURE — 3078F DIAST BP <80 MM HG: CPT | Mod: CPTII,S$GLB,,

## 2025-04-21 PROCEDURE — 87591 N.GONORRHOEAE DNA AMP PROB: CPT

## 2025-04-21 PROCEDURE — 1159F MED LIST DOCD IN RCRD: CPT | Mod: CPTII,S$GLB,,

## 2025-04-21 PROCEDURE — 99999 PR PBB SHADOW E&M-EST. PATIENT-LVL III: CPT | Mod: PBBFAC,,,

## 2025-04-21 PROCEDURE — 3008F BODY MASS INDEX DOCD: CPT | Mod: CPTII,S$GLB,,

## 2025-04-21 PROCEDURE — 99396 PREV VISIT EST AGE 40-64: CPT | Mod: S$GLB,,,

## 2025-04-21 PROCEDURE — 3077F SYST BP >= 140 MM HG: CPT | Mod: CPTII,S$GLB,,

## 2025-04-21 NOTE — PROGRESS NOTES
CC: Annual  HISTORY OF PRESENT ILLNESS:    Sandra Rodriguez is a 49 y.o. female, , Patient's last menstrual period was 04/10/2025 (exact date).,  presents for a routine exam and has no complaints.   She is sexually active with her . She uses BTL for contraception.  History of STDs: denies.  She does want STD screening.  Denies any other GYN complaints.      The patient participates in regular exercise: no.  The patient does not smoke.  The patient wears seatbelts.   Pt denies any domestic violence. Reports  tattoos or blood transfusions    SCREENING:   Pap:  nilm/ hpv neg (due today)   Mammogram:  neg (overdue)  Colonoscopy: N/A   DEXA:  N/A     GYN FH:   Breast cancer: none  Colon cancer: none  Ovarian cancer: none  Endometrial cancer: none     OB History    Para Term  AB Living   2 2 2 0 0 2   SAB IAB Ectopic Multiple Live Births   0 0 0 0 2      # Outcome Date GA Lbr Barak/2nd Weight Sex Type Anes PTL Lv   2 Term     F CS-LTranv   JEREMY   1 Term     F CS-LTranv   JEREMY        Past Medical History:  Past Medical History:   Diagnosis Date    DVT (deep venous thrombosis)     Fibroids        Past Surgical History:  Past Surgical History:   Procedure Laterality Date     SECTION      TUBAL LIGATION         Family History:  Family History   Problem Relation Name Age of Onset    Cancer Maternal Grandfather      No Known Problems Daughter      No Known Problems Daughter      Breast cancer Neg Hx      Colon cancer Neg Hx      Ovarian cancer Neg Hx         Allergies:  Review of patient's allergies indicates:  No Known Allergies    Medications:  Medications Ordered Prior to Encounter[1]    Social History:  Social History[2]            ROS:   GENERAL: Feeling well overall. Denies fever or chills.   SKIN: Denies rash or lesions.   HEAD: Denies head injury or headache.   NODES: Denies enlarged lymph nodes.   CHEST: Denies chest pain or shortness of breath.   CARDIOVASCULAR: Denies palpitations  or left sided chest pain.   ABDOMEN: No abdominal pain, constipation, diarrhea, nausea, vomiting or rectal bleeding.   URINARY: No dysuria, hematuria, or burning on urination.  REPRODUCTIVE: See HPI.   BREASTS: Denies pain, lumps, or nipple discharge.   HEMATOLOGIC: No easy bruisability or excessive bleeding.   MUSCULOSKELETAL: Denies joint pain or swelling.   NEUROLOGIC: Denies syncope or weakness.   PSYCHIATRIC: Denies depression, anxiety or mood swings.    PE:   APPEARANCE: Well nourished, well developed, Black or  female in no acute distress.  NODES: no cervical, supraclavicular, or inguinal lymphadenopathy  BREASTS: Symmetrical, no skin changes or visible lesions. No palpable masses, nipple discharge or adenopathy bilaterally.  ABDOMEN: Soft. No tenderness or masses. No distention. No hernias palpated. No CVA tenderness.  VULVA: No lesions. Normal external female genitalia.  URETHRAL MEATUS: Normal size and location, no lesions, no prolapse.  URETHRA: No masses, tenderness, or prolapse.  VAGINA:  Moist. No lesions or lacerations noted. No abnormal discharge present. No odor present.   CERVIX: No lesions or discharge. No cervical motion tenderness.   UTERUS: Normal size, regular shape, mobile, non-tender.  ADNEXA: No tenderness. No fullness or masses palpated in the adnexal regions.   ANUS PERINEUM: Normal.      Diagnosis:  1. Encounter for gynecological examination without abnormal finding    2. Breast cancer screening by mammogram        Plan:     Orders Placed This Encounter    Mammo Digital Screening Bilat w/ Tony ([facility])    C. trachomatis/N. gonorrhoeae by AMP DNA    Liquid-Based Pap Smear, Screening       - Pap and HPV done today.  - Screening tests as ordered.  - Diet and exercise encouraged.  Condom use encouraged for STD prevention.  Seat belt use encouraged.  Reviewed ASCCP Pap guidelines and screening recommendations.  Calcium and vitamin D recommended.     Counseling: injury prevention:  Driving under the influence of alcohol  Weapons  Seatbelts  Bicycle helmets  Adequate sleep  Exercise  Stress management techniques  indications for and frequency of periodic gynecologic exam  reviewed current Pap guidelines. Explained new understanding of natural history of cervical disease and improved Paps. Recommended guideline concordant care.    The patient was counseled today on ACS PAP guidelines, with recommendations for yearly pelvic exams unless their uterus, cervix, and ovaries were removed for benign reasons; in that case, examinations every 3-5 years are recommended.  The patient was also counseled regarding monthly breast self-examination, routine STD screening for at-risk populations, prophylactic immunizations for transmitted infections such as  HPV, Pertussis, or Influenza as appropriate, and yearly mammograms when indicated by ACS guidelines.  She was advised to see her primary care physician for all other health maintenance.    Counseling session lasted approximately 10 minutes, and all her questions were answered.    Follow-up with me in 1 year for routine exam or sooner if needed.    Caroline Early, [id number]                 [1]   Current Outpatient Medications on File Prior to Visit   Medication Sig Dispense Refill    ferrous gluconate (FERGON) 324 MG tablet Take 1 tablet (324 mg total) by mouth 2 (two) times daily with meals. 60 tablet 11    [DISCONTINUED] apixaban (ELIQUIS) 5 mg Tab Take 1 tablet (5 mg total) by mouth 2 (two) times daily. (Patient not taking: Reported on 3/26/2024) 60 tablet 5    [DISCONTINUED] furosemide (LASIX) 20 MG tablet TAKE 1 TABLET BY MOUTH EVERY DAY FOR 3 DAYS      [DISCONTINUED] oxyCODONE-acetaminophen (PERCOCET) 5-325 mg per tablet Take 1 tablet by mouth every 8 (eight) hours as needed for Pain. (Patient not taking: Reported on 7/7/2022) 10 tablet 0     No current facility-administered medications on file prior to visit.   [2]   Social History  Tobacco Use    Smoking  status: Never    Smokeless tobacco: Never   Substance Use Topics    Alcohol use: Not Currently    Drug use: No

## 2025-04-25 LAB
C TRACH DNA SPEC QL NAA+PROBE: NOT DETECTED
CTGC SOURCE (OHS) ORD-325: NORMAL
N GONORRHOEA DNA UR QL NAA+PROBE: NOT DETECTED

## 2025-04-28 ENCOUNTER — RESULTS FOLLOW-UP (OUTPATIENT)
Dept: OBSTETRICS AND GYNECOLOGY | Facility: CLINIC | Age: 50
End: 2025-04-28

## 2025-05-06 ENCOUNTER — HOSPITAL ENCOUNTER (OUTPATIENT)
Dept: RADIOLOGY | Facility: HOSPITAL | Age: 50
Discharge: HOME | End: 2025-05-06
Payer: COMMERCIAL

## 2025-05-06 DIAGNOSIS — Z12.31 BREAST CANCER SCREENING BY MAMMOGRAM: ICD-10-CM

## 2025-05-06 PROCEDURE — 77063 BREAST TOMOSYNTHESIS BI: CPT | Mod: 26,,, | Performed by: RADIOLOGY

## 2025-05-06 PROCEDURE — 77067 SCR MAMMO BI INCL CAD: CPT | Mod: 26,,, | Performed by: RADIOLOGY

## 2025-05-06 PROCEDURE — 77067 SCR MAMMO BI INCL CAD: CPT | Mod: TC
